# Patient Record
Sex: MALE | Race: WHITE | Employment: FULL TIME | URBAN - METROPOLITAN AREA
[De-identification: names, ages, dates, MRNs, and addresses within clinical notes are randomized per-mention and may not be internally consistent; named-entity substitution may affect disease eponyms.]

---

## 2019-12-26 ENCOUNTER — APPOINTMENT (OUTPATIENT)
Dept: GENERAL RADIOLOGY | Age: 24
End: 2019-12-26

## 2019-12-26 ENCOUNTER — HOSPITAL ENCOUNTER (EMERGENCY)
Age: 24
Discharge: HOME | End: 2019-12-26

## 2019-12-26 VITALS
BODY MASS INDEX: 37.1 KG/M2 | DIASTOLIC BLOOD PRESSURE: 80 MMHG | SYSTOLIC BLOOD PRESSURE: 156 MMHG | RESPIRATION RATE: 18 BRPM | TEMPERATURE: 97.4 F | OXYGEN SATURATION: 97 % | HEART RATE: 109 BPM | WEIGHT: 265 LBS | HEIGHT: 71 IN

## 2019-12-26 DIAGNOSIS — M25.531 PAIN IN RIGHT WRIST: Primary | ICD-10-CM

## 2019-12-26 PROCEDURE — 73110 X-RAY EXAM OF WRIST: CPT

## 2019-12-26 PROCEDURE — 99283 EMERGENCY DEPT VISIT LOW MDM: CPT

## 2019-12-26 RX ORDER — NAPROXEN 250 MG/1
250 TABLET ORAL ONCE
Status: DISCONTINUED | OUTPATIENT
Start: 2019-12-26 | End: 2019-12-26 | Stop reason: HOSPADM

## 2019-12-26 RX ORDER — NAPROXEN 500 MG/1
500 TABLET ORAL 2 TIMES DAILY
Qty: 20 TABLET | Refills: 0 | Status: SHIPPED | OUTPATIENT
Start: 2019-12-26 | End: 2021-08-03

## 2019-12-26 ASSESSMENT — ENCOUNTER SYMPTOMS
COLOR CHANGE: 0
RHINORRHEA: 0
SORE THROAT: 0
SHORTNESS OF BREATH: 0
ABDOMINAL PAIN: 0

## 2019-12-26 ASSESSMENT — PAIN DESCRIPTION - PAIN TYPE: TYPE: ACUTE PAIN

## 2019-12-26 ASSESSMENT — PAIN DESCRIPTION - LOCATION: LOCATION: WRIST

## 2019-12-26 ASSESSMENT — PAIN SCALES - GENERAL: PAINLEVEL_OUTOF10: 10

## 2019-12-26 ASSESSMENT — PAIN DESCRIPTION - ORIENTATION: ORIENTATION: RIGHT

## 2021-08-03 ENCOUNTER — HOSPITAL ENCOUNTER (EMERGENCY)
Age: 26
Discharge: HOME | End: 2021-08-03
Attending: EMERGENCY MEDICINE

## 2021-08-03 VITALS
OXYGEN SATURATION: 97 % | WEIGHT: 250 LBS | DIASTOLIC BLOOD PRESSURE: 82 MMHG | BODY MASS INDEX: 35 KG/M2 | HEART RATE: 92 BPM | TEMPERATURE: 98.5 F | RESPIRATION RATE: 16 BRPM | HEIGHT: 71 IN | SYSTOLIC BLOOD PRESSURE: 141 MMHG

## 2021-08-03 DIAGNOSIS — M10.071 ACUTE IDIOPATHIC GOUT INVOLVING TOE OF RIGHT FOOT: Primary | ICD-10-CM

## 2021-08-03 PROCEDURE — 99282 EMERGENCY DEPT VISIT SF MDM: CPT

## 2021-08-03 RX ORDER — OXYCODONE HYDROCHLORIDE AND ACETAMINOPHEN 5; 325 MG/1; MG/1
1 TABLET ORAL EVERY 6 HOURS PRN
Qty: 12 TABLET | Refills: 0 | Status: SHIPPED | OUTPATIENT
Start: 2021-08-03 | End: 2021-08-06

## 2021-08-03 RX ORDER — PREDNISONE 20 MG/1
20 TABLET ORAL 2 TIMES DAILY
Qty: 10 TABLET | Refills: 0 | Status: SHIPPED | OUTPATIENT
Start: 2021-08-03 | End: 2021-08-08

## 2021-08-03 NOTE — ED PROVIDER NOTES
on the following medications from the ED:  Discharge Medication List as of 8/3/2021 11:32 AM      START taking these medications    Details   predniSONE (DELTASONE) 20 MG tablet Take 1 tablet by mouth 2 times daily for 5 days, Disp-10 tablet, R-0Print      oxyCODONE-acetaminophen (PERCOCET) 5-325 MG per tablet Take 1 tablet by mouth every 6 hours as needed for Pain for up to 3 days. Intended supply: 3 days. Take lowest dose possible to manage pain, Disp-12 tablet, R-0Print               Disposition  Pt is discharged in stable condition.     Disposition Vitals:  BP (!) 141/82   Pulse 92   Temp 98.5 °F (36.9 °C) (Oral)   Resp 16   Ht 5' 11\" (1.803 m)   Wt 250 lb (113.4 kg)   SpO2 97%   BMI 34.87 kg/m²        Ronny Noriega MD  08/05/21 7792

## 2021-10-12 ENCOUNTER — HOSPITAL ENCOUNTER (EMERGENCY)
Age: 26
Discharge: HOME | End: 2021-10-12

## 2021-10-12 VITALS
RESPIRATION RATE: 18 BRPM | TEMPERATURE: 99.6 F | HEART RATE: 101 BPM | DIASTOLIC BLOOD PRESSURE: 77 MMHG | OXYGEN SATURATION: 98 % | SYSTOLIC BLOOD PRESSURE: 136 MMHG

## 2021-10-12 DIAGNOSIS — J02.9 SORE THROAT: ICD-10-CM

## 2021-10-12 DIAGNOSIS — J02.9 ACUTE PHARYNGITIS, UNSPECIFIED ETIOLOGY: Primary | ICD-10-CM

## 2021-10-12 PROCEDURE — 99283 EMERGENCY DEPT VISIT LOW MDM: CPT

## 2021-10-12 RX ORDER — PREDNISONE 10 MG/1
60 TABLET ORAL DAILY
Qty: 30 TABLET | Refills: 0 | Status: SHIPPED | OUTPATIENT
Start: 2021-10-12 | End: 2021-10-17

## 2021-10-12 RX ORDER — AZITHROMYCIN 250 MG/1
250 TABLET, FILM COATED ORAL SEE ADMIN INSTRUCTIONS
Qty: 6 TABLET | Refills: 0 | Status: SHIPPED | OUTPATIENT
Start: 2021-10-12 | End: 2021-10-17

## 2021-10-12 ASSESSMENT — PAIN DESCRIPTION - DESCRIPTORS: DESCRIPTORS: ACHING

## 2021-10-12 ASSESSMENT — ENCOUNTER SYMPTOMS
ABDOMINAL PAIN: 0
WHEEZING: 0
DIARRHEA: 0
SHORTNESS OF BREATH: 0
BACK PAIN: 0
NAUSEA: 0
SORE THROAT: 1
COLOR CHANGE: 0
VOMITING: 0
COUGH: 1

## 2021-10-12 ASSESSMENT — PAIN DESCRIPTION - PROGRESSION: CLINICAL_PROGRESSION: GRADUALLY WORSENING

## 2021-10-12 ASSESSMENT — PAIN DESCRIPTION - PAIN TYPE: TYPE: ACUTE PAIN

## 2021-10-12 ASSESSMENT — PAIN DESCRIPTION - ONSET: ONSET: GRADUAL

## 2021-10-12 ASSESSMENT — PAIN DESCRIPTION - FREQUENCY: FREQUENCY: CONTINUOUS

## 2021-10-12 ASSESSMENT — PAIN DESCRIPTION - LOCATION: LOCATION: THROAT

## 2021-10-12 ASSESSMENT — PAIN DESCRIPTION - ORIENTATION: ORIENTATION: INNER

## 2021-10-12 ASSESSMENT — PAIN - FUNCTIONAL ASSESSMENT: PAIN_FUNCTIONAL_ASSESSMENT: ACTIVITIES ARE NOT PREVENTED

## 2021-10-12 ASSESSMENT — PAIN SCALES - GENERAL: PAINLEVEL_OUTOF10: 10

## 2021-10-12 NOTE — LETTER
[facility] ED  [address] [address]  Phone: [phone]               October 12, 2021    Patient: Leilani Delarosa   YOB: 1995   Date of Visit: 10/12/2021       To Whom It May Concern:    Esperanza Noel was seen and treated in our emergency department on 10/12/2021. He may return to work on 10/14/2021.       Sincerely,       Kiya Benson RN         Signature:__________________________________

## 2021-10-13 NOTE — ED PROVIDER NOTES
**ADVANCED PRACTICE PROVIDER, I HAVE EVALUATED THIS [facility] ED  EMERGENCY DEPARTMENT ENCOUNTER      Pt Name: Alexis Toney  SOY:[id number]  [place] 1995  Date of evaluation: 10/12/2021  Provider: BABS Gaspar CNP      Chief Complaint:    Chief Complaint   Patient presents with    Pharyngitis     started saturday, worsened yesterday     Cough         Nursing Notes, Past Medical Hx, Past Surgical Hx, Social Hx, Allergies, and Family Hx were all reviewed and agreed with or any disagreements were addressed in the HPI.    HPI: (Location, Duration, Timing, Severity, Quality, Assoc Sx, Context, Modifying factors)    Chief Complaint of sore throat not feeling well since Saturday I do not get it done because of [we cannot give her anything any stronger    This is a  27 y.o. male who presents today with sore throat, states he has had the sore throat since Saturday, however, today is so bad he was having a hard time at work, however, he is able to swallow, but it's very painful, he has been taking throat lozenges, but medication is not helping. Rates his sore throat a 10 out of 10. States it just feels like it is gradually getting worse. He states he has had an occasional cough however he is a smoker, he smokes about a quarter of a pack a day. Denies any chest pain pleuritic chest pain or shortness of breath. States that he has had tactile fever but has not taken his temperature, states he does not typically get a temperature. He denies any use of antipyretics, has not tried any Tylenol or Motrin, has only been using cough drops. He denies any additional complaints, no additional aggravating relieving factors. Patient presents awake, alert and in no acute distress or toxic appearance. PastMedical/Surgical History:      Diagnosis Date    GERD (gastroesophageal reflux disease)     Gout     Headache     MIGRAINES     History reviewed.  No pertinent surgical history. Medications:  Previous Medications    COLCHICINE (COLCRYS) 0.6 MG TABLET    1 tablet twice a day until symptoms resolve    NAPROXEN (NAPROSYN) 500 MG TABLET    Take 1 tablet by mouth 2 times daily         Review of Systems:  (2-9 systems needed)  Review of Systems   Constitutional: Negative for chills and fever. HENT: Positive for sore throat. Negative for congestion. Patient complains of sore throat, states he has had the sore throat since Saturday, however, today is so bad he was having a hard time at work, however, he is able to swallow, but it's very painful, he has been taking throat lozenges, but medication is not helping. Rates his sore throat a 10 out of 10. States it just feels like it is gradually getting worse. Respiratory: Positive for cough. Negative for shortness of breath and wheezing. He does report having occasional cough, states that he is a smoker, pack of cigarettes lasts him about 2 to 3 days. However denies any chest pain pleuritic chest pain or shortness of breath. Cardiovascular: Negative for chest pain. Gastrointestinal: Negative for abdominal pain, diarrhea, nausea and vomiting. Genitourinary: Negative for difficulty urinating, dysuria, frequency and hematuria. Musculoskeletal: Negative for back pain. Skin: Negative for color change. Neurological: Negative for weakness, numbness and headaches. \"Positives and Pertinent negatives as per HPI\"    Physical Exam:  Physical Exam  Vitals and nursing note reviewed. Constitutional:       Appearance: He is well-developed. He is not diaphoretic. HENT:      Head: Normocephalic. Right Ear: External ear normal.      Left Ear: External ear normal.      Mouth/Throat:      Mouth: Mucous membranes are moist.      Pharynx: Posterior oropharyngeal erythema present. Comments: Patient has a significant mild erythema in the posterior pharynx.   Tonsils are 1+ in size, the uvula is midline, there is no obvious exudate but his throat does appear very well. Eyes:      General: No scleral icterus. Right eye: No discharge. Left eye: No discharge. Neck:      Comments: Patient has bilateral anterior cervical lymphadenopathy however lymph nodes are slightly tender but mobile. No posterior cervical lymphadenopathy. No nuchal rigidity or meningeal irritation. Cardiovascular:      Rate and Rhythm: Normal rate and regular rhythm. Pulmonary:      Effort: Pulmonary effort is normal. No respiratory distress. Breath sounds: Normal breath sounds. Comments: Lungs are clear anteriorly and posteriorly, patient is not tachypneic or dyspneic, saturations 98% on room air. Abdominal:      Palpations: Abdomen is soft. Musculoskeletal:         General: Normal range of motion. Cervical back: Normal range of motion and neck supple. Tenderness present. Skin:     General: Skin is warm. Capillary Refill: Capillary refill takes less than 2 seconds. Coloration: Skin is not pale. Neurological:      General: No focal deficit present. Mental Status: He is alert and oriented to person, place, and time. GCS: GCS eye subscore is 4. GCS verbal subscore is 5. GCS motor subscore is 6. Comments: Patient is awake, alert following all commands correctly, neurologically intact no focal deficits. Psychiatric:         Behavior: Behavior normal.         MEDICAL DECISION MAKING    Vitals:    Vitals:    10/12/21 2007   BP: 136/77   Pulse: 101   Resp: 18   Temp: 99.6 °F (37.6 °C)   TempSrc: Temporal   SpO2: 98%       LABS:Labs Reviewed - No data to display     Remainder of labs reviewed and were negative at this time or not returned at the time of this note.     RADIOLOGY:   Non-plain film images such as CT, Ultrasound and MRI are read by the radiologist. Viry BERRIOS APRN - CNP have directly visualized the radiologic plain film image(s) with the below findings:      Interpretation per the Radiologist below, if available at the time of this note:    No orders to display        No results found. MEDICAL DECISION MAKING / ED COURSE:      PROCEDURES:   Procedures    None    Patient was given:  Medications - No data to display    Patient complains of sore throat, states he has had the sore throat since Saturday, however, today is so bad he was having a hard time at work, however, he is able to swallow, but it's very painful, he has been taking throat lozenges, but medication is not helping. Rates his sore throat a 10 out of 10. States it just feels like it is gradually getting worse. After evaluation and examination of the patient he meets Centor criteria with sore throat, fever, smoker cough only and erythema, however, I have no concerns for covid19. In addition, the patient is adequately hydrated, clinically nontoxic, and does not have any findings that would suggest impending airway issues. I do not suspect peritonsillar abscess, retropharyngeal abscess, epiglottitis or other more emergent etiology. There is no hot potato voice. Therefore, shared medical decision was made between the patient and myself and we agreed that he would be discharged home with outpatient follow up. He was started on a z-pack and prednisone, I encouraged him to stop smoking. Patient is advised to follow-up with their family doctor within the next 24-48 hours and return to the emergency department with any concerns. The patient tolerated their visit well. I evaluated the patient. The physician was available for consultation as needed. The patient and / or the family were informed of the results of any tests, a time was given to answer questions, a plan was proposed and they agreed with plan. Patient verbalized understanding of discharge instructions and was discharged from the department in stable condition. CLINICAL IMPRESSION:  1.  Acute pharyngitis, unspecified etiology    2.  Sore throat        DISPOSITION Decision To Discharge 10/12/2021 08:22:22 PM      PATIENT REFERRED TO:  Piedad Jennings  [phone]  Schedule an appointment as soon as possible for a visit in 3 days  this is a PCP referral, follow up in the next week for re-evaluation    [place] [facility] PHYSICAL [facility] ED  [address] [facility]  Go to   If symptoms worsen      DISCHARGE MEDICATIONS:  New Prescriptions    AZITHROMYCIN (ZITHROMAX) 250 MG TABLET    Take 1 tablet by mouth See Admin Instructions for 5 days 500mg on day 1 followed by 250mg on days 2 - 5    PREDNISONE (DELTASONE) 10 MG TABLET    Take 6 tablets by mouth daily for 5 doses       DISCONTINUED MEDICATIONS:  Discontinued Medications    No medications on file              (Please note the MDM and HPI sections of this note were completed with a voice recognition program.  Efforts were made to edit the dictations but occasionally words are mis-transcribed.)    Electronically signed, BABS Carney CNP,          BBAS Carney CNP  10/12/21 2050

## 2021-12-30 ENCOUNTER — HOSPITAL ENCOUNTER (EMERGENCY)
Age: 26
Discharge: HOME | End: 2021-12-30

## 2021-12-30 VITALS
HEIGHT: 71 IN | BODY MASS INDEX: 35 KG/M2 | TEMPERATURE: 97.2 F | HEART RATE: 98 BPM | SYSTOLIC BLOOD PRESSURE: 142 MMHG | WEIGHT: 250 LBS | OXYGEN SATURATION: 98 % | DIASTOLIC BLOOD PRESSURE: 92 MMHG | RESPIRATION RATE: 18 BRPM

## 2021-12-30 DIAGNOSIS — M54.31 SCIATICA OF RIGHT SIDE: Primary | ICD-10-CM

## 2021-12-30 PROCEDURE — 6370000000 HC RX 637 (ALT 250 FOR IP): Performed by: NURSE PRACTITIONER

## 2021-12-30 PROCEDURE — 99284 EMERGENCY DEPT VISIT MOD MDM: CPT

## 2021-12-30 RX ORDER — METHOCARBAMOL 500 MG/1
500 TABLET, FILM COATED ORAL 3 TIMES DAILY
Qty: 15 TABLET | Refills: 0 | Status: SHIPPED | OUTPATIENT
Start: 2021-12-30 | End: 2022-01-04

## 2021-12-30 RX ORDER — METHOCARBAMOL 500 MG/1
500 TABLET, FILM COATED ORAL ONCE
Status: COMPLETED | OUTPATIENT
Start: 2021-12-30 | End: 2021-12-30

## 2021-12-30 RX ORDER — LIDOCAINE 50 MG/G
1 PATCH TOPICAL DAILY
Qty: 10 PATCH | Refills: 0 | Status: SHIPPED | OUTPATIENT
Start: 2021-12-30 | End: 2022-01-09

## 2021-12-30 RX ORDER — LIDOCAINE 4 G/G
1 PATCH TOPICAL ONCE
Status: DISCONTINUED | OUTPATIENT
Start: 2021-12-30 | End: 2021-12-30 | Stop reason: HOSPADM

## 2021-12-30 RX ORDER — PREDNISONE 20 MG/1
TABLET ORAL
Qty: 18 TABLET | Refills: 0 | Status: SHIPPED | OUTPATIENT
Start: 2021-12-30 | End: 2022-09-21

## 2021-12-30 RX ORDER — PREDNISONE 20 MG/1
60 TABLET ORAL ONCE
Status: COMPLETED | OUTPATIENT
Start: 2021-12-30 | End: 2021-12-30

## 2021-12-30 RX ADMIN — PREDNISONE 60 MG: 20 TABLET ORAL at 18:20

## 2021-12-30 RX ADMIN — METHOCARBAMOL TABLETS 500 MG: 500 TABLET, COATED ORAL at 18:20

## 2021-12-30 ASSESSMENT — PAIN SCALES - GENERAL: PAINLEVEL_OUTOF10: 10

## 2021-12-30 ASSESSMENT — ENCOUNTER SYMPTOMS
SORE THROAT: 0
BACK PAIN: 1
SHORTNESS OF BREATH: 0
RHINORRHEA: 0
ABDOMINAL PAIN: 0
COLOR CHANGE: 0

## 2021-12-30 ASSESSMENT — PAIN DESCRIPTION - PAIN TYPE: TYPE: ACUTE PAIN

## 2021-12-30 ASSESSMENT — PAIN DESCRIPTION - ORIENTATION: ORIENTATION: RIGHT

## 2021-12-30 NOTE — Clinical Note
Jose Dickerson was seen and treated in our emergency department on 12/30/2021. He may return to work on 01/01/2022. If you have any questions or concerns, please don't hesitate to call.       Afshin Gerber, APRN - CNP

## 2021-12-30 NOTE — Clinical Note
Swetha Ramírez was seen and treated in our emergency department on 12/30/2021. He may return to work on 01/01/2022. If you have any questions or concerns, please don't hesitate to call.       Edy Tesfaye, APRN - CNP

## 2021-12-30 NOTE — ED PROVIDER NOTES
Evaluated by [id number] [facility] Provider          [facility]  EMERGENCY DEPARTMENT ENCOUNTER        Pt Name: Candelario Landau  MRN: [id number]  [place] 1995  Dateof evaluation: 12/30/2021  Provider: BABS Arvizu CNP  PCP: No primary care provider on file. ED Attending: No att. providers found    [facility]       Chief Complaint   Patient presents with    Back Pain     right sided butt pain and leg pain       HISTORY OF PRESENTILLNESS   (Location/Symptom, Timing/Onset, Context/Setting, Quality, Duration, Modifying Factors, Severity)  Note limiting factors. Candelario Landau is a 27 y.o. male for right buttocks pain. Onset was for a while. Context includes patient states that he has pain to his right buttocks that radiates down his right leg for a while. He denies any injuries. He denies any back pain or caudal anesthesia. Alleviating factors include nothing. Aggravating factors include nothing. Pain is 10/10. Tylenol and Motrin has been used for pain today. Nursing Notes were all reviewed and agreed with or any disagreements were addressed  in the HPI. REVIEW OF SYSTEMS    (2-9 systems for level 4, 10 or more for level 5)     Review of Systems   Constitutional: Negative for fever. HENT: Negative for congestion, rhinorrhea and sore throat. Respiratory: Negative for shortness of breath. Cardiovascular: Negative for chest pain. Gastrointestinal: Negative for abdominal pain. Genitourinary: Negative for decreased urine volume and difficulty urinating. Musculoskeletal: Positive for back pain. Negative for arthralgias and myalgias. Skin: Negative for color change and rash. Neurological: Negative for dizziness and light-headedness. Psychiatric/Behavioral: Negative for agitation. All other systems reviewed and are negative. Positives and Pertinent negatives as per HPI.   Except as noted above in the ROS, all other systems were reviewed and negative. PAST MEDICAL HISTORY     Past Medical History:   Diagnosis Date    GERD (gastroesophageal reflux disease)     Gout     Headache     MIGRAINES         SURGICAL HISTORY     No past surgical history on file. CURRENT MEDICATIONS       Previous Medications    COLCHICINE (COLCRYS) 0.6 MG TABLET    1 tablet twice a day until symptoms resolve    NAPROXEN (NAPROSYN) 500 MG TABLET    Take 1 tablet by mouth 2 times daily         ALLERGIES     Patient has no known allergies. FAMILY HISTORY     No family history on file. SOCIAL HISTORY       Social History     Socioeconomic History    Marital status: Single     Spouse name: Not on file    Number of children: Not on file    Years of education: Not on file    Highest education level: Not on file   Occupational History    Not on file   Tobacco Use    Smoking status: Current Some Day Smoker     Packs/day: 0.50     Types: Cigars    Smokeless tobacco: Never Used   Substance and Sexual Activity    Alcohol use: Yes     Comment: occ    Drug use: No    Sexual activity: Not on file   Other Topics Concern    Not on file   Social History Narrative    Not on file     Social Determinants of Health     Financial Resource Strain:     Difficulty of Paying Living Expenses: Not on file   Food Insecurity:     Worried About Running Out of Food in the Last Year: Not on file    Hieu of Food in the Last Year: Not on file   Transportation Needs:     Lack of Transportation (Medical): Not on file    Lack of Transportation (Non-Medical):  Not on file   Physical Activity:     Days of Exercise per Week: Not on file    Minutes of Exercise per Session: Not on file   Stress:     Feeling of Stress : Not on file   Social Connections:     Frequency of Communication with Friends and Family: Not on file    Frequency of Social Gatherings with Friends and Family: Not on file    Attends Mandaeism Services: Not on file   [employer] of Clubs or Organizations: Not on file    Attends Club or Organization Meetings: Not on file    Marital Status: Not on file   Intimate Partner Violence:     Fear of Current or Ex-Partner: Not on file    Emotionally Abused: Not on file    Physically Abused: Not on file    Sexually Abused: Not on file   Housing Stability:     Unable to Pay for Housing in the Last Year: Not on file    Number of [place] in the Last Year: Not on file    Unstable Housing in the Last Year: Not on file       SCREENINGS             PHYSICAL EXAM  (up to 7 for level 4, 8 or more for level 5)     ED Triage Vitals [12/30/21 1558]   BP Temp Temp Source Pulse Resp SpO2 Height Weight   (!) 142/92 97.2 °F (36.2 °C) Temporal 98 18 98 % 5' 11\" (1.803 m) 250 lb (113.4 kg)       Physical Exam  Constitutional:       Appearance: He is well-developed. HENT:      Head: Normocephalic and atraumatic. Cardiovascular:      Rate and Rhythm: Normal rate. Pulmonary:      Effort: Pulmonary effort is normal. No respiratory distress. Abdominal:      General: There is no distension. Palpations: Abdomen is soft. Tenderness: There is no abdominal tenderness. Musculoskeletal:         General: Tenderness present. No swelling, deformity or signs of injury. Cervical back: Normal range of motion. Comments: Decreased range of motion due to pain elicited with movement. Tenderness with palpation to the right butt cheek. Sensation strength and pulses intact to lower extremities. Patient denies any caudal anesthesia but has right-sided radiculopathy. Deep tendon reflexes intact. Skin:     General: Skin is warm and dry. Neurological:      Mental Status: He is alert and oriented to person, place, and time. DIAGNOSTIC RESULTS   LABS:    Labs Reviewed - No data to display    All other labs werewithin normal range or not returned as of this dictation. EKG:  All EKG's are interpreted by the Emergency Department Physician who either signs or Co-signs this chart in the absence of a cardiologist.  Please see their note for interpretation of EKG. RADIOLOGY:           Interpretation per the Radiologist below, if available at the time of this note:    No orders to display     No results found. PROCEDURES   Unless otherwise noted below, none     Procedures     CRITICAL CARE TIME   N/A    CONSULTS:  None      EMERGENCYDEPARTMENT COURSE and DIFFERENTIAL DIAGNOSIS/MDM:   Vitals:    Vitals:    12/30/21 1558   BP: (!) 142/92   Pulse: 98   Resp: 18   Temp: 97.2 °F (36.2 °C)   TempSrc: Temporal   SpO2: 98%   Weight: 250 lb (113.4 kg)   Height: 5' 11\" (1.803 m)       Patient was given the following medications:  Medications   predniSONE (DELTASONE) tablet 60 mg (has no administration in time range)   lidocaine 4 % external patch 1 patch (has no administration in time range)   methocarbamol (ROBAXIN) tablet 500 mg (has no administration in time range)       Patient was seen and evaluated by myself. Patient here for right-sided buttocks pain. Patient states he has had pain for a while to his right buttocks that radiates in his right leg. He denies any caudal anesthesia. On exam he is awake and alert hemodynamically stable nontoxic in appearance. He is neurologically intact to his arms and legs. Patient was provided with prednisone lidocaine patch and Robaxin. Patient will be discharged with instructions to follow-up with the PCP provided. He was given a physical therapy and orthopedic referral.  He was also encouraged to return to the ED for worsening symptoms. Patient was ultimately discharged with all questions answered. Patient did have prednisone lidocaine and Robaxin prescription sent to the [facility] in [facility]. The patient tolerated their visit well. I have evaluated this patient. My supervising physician was available for consultation.  The patient and / or the family were informed of the results of any tests, a time was given to

## 2021-12-30 NOTE — Clinical Note
Sarah Fine was seen and treated in our emergency department on 12/30/2021. He may return to work on 01/01/2022. If you have any questions or concerns, please don't hesitate to call.       Uche Hoffmann, BABS - CNP

## 2022-01-25 ENCOUNTER — APPOINTMENT (OUTPATIENT)
Dept: GENERAL RADIOLOGY | Age: 27
End: 2022-01-25

## 2022-01-25 ENCOUNTER — HOSPITAL ENCOUNTER (EMERGENCY)
Age: 27
Discharge: HOME | End: 2022-01-25

## 2022-01-25 VITALS
SYSTOLIC BLOOD PRESSURE: 152 MMHG | RESPIRATION RATE: 16 BRPM | BODY MASS INDEX: 35 KG/M2 | OXYGEN SATURATION: 97 % | HEART RATE: 88 BPM | DIASTOLIC BLOOD PRESSURE: 102 MMHG | TEMPERATURE: 98 F | HEIGHT: 71 IN | WEIGHT: 250 LBS

## 2022-01-25 DIAGNOSIS — M25.561 ACUTE PAIN OF RIGHT KNEE: ICD-10-CM

## 2022-01-25 DIAGNOSIS — M25.461 KNEE EFFUSION, RIGHT: Primary | ICD-10-CM

## 2022-01-25 PROCEDURE — 99283 EMERGENCY DEPT VISIT LOW MDM: CPT

## 2022-01-25 PROCEDURE — 73562 X-RAY EXAM OF KNEE 3: CPT

## 2022-01-25 RX ORDER — PREDNISONE 10 MG/1
60 TABLET ORAL DAILY
Qty: 30 TABLET | Refills: 0 | Status: SHIPPED | OUTPATIENT
Start: 2022-01-25 | End: 2022-01-30

## 2022-01-25 RX ORDER — ACETAMINOPHEN AND CODEINE PHOSPHATE 300; 30 MG/1; MG/1
1 TABLET ORAL 3 TIMES DAILY PRN
Qty: 6 TABLET | Refills: 0 | Status: SHIPPED | OUTPATIENT
Start: 2022-01-25 | End: 2022-01-28

## 2022-01-25 ASSESSMENT — PAIN SCALES - GENERAL: PAINLEVEL_OUTOF10: 10

## 2022-01-25 ASSESSMENT — PAIN DESCRIPTION - ONSET: ONSET: GRADUAL

## 2022-01-25 ASSESSMENT — PAIN DESCRIPTION - ORIENTATION: ORIENTATION: RIGHT

## 2022-01-25 ASSESSMENT — PAIN DESCRIPTION - PAIN TYPE: TYPE: ACUTE PAIN

## 2022-01-25 ASSESSMENT — PAIN DESCRIPTION - LOCATION: LOCATION: KNEE

## 2022-01-25 ASSESSMENT — PAIN DESCRIPTION - DESCRIPTORS: DESCRIPTORS: THROBBING

## 2022-01-25 ASSESSMENT — PAIN DESCRIPTION - FREQUENCY: FREQUENCY: CONTINUOUS

## 2022-01-26 NOTE — ED NOTES
--Patient provided with discharge instructions and any prescriptions. --Instructions, dosing, and follow-up appointments reviewed with patient/family. No further questions or needs at this time. --Vital signs and patient stable upon discharge. --Patient ambulatory to [facility].        Yoni Rodrigues RN  01/25/22 [id number]

## 2022-01-26 NOTE — ED PROVIDER NOTES
**ADVANCED PRACTICE PROVIDER, I HAVE EVALUATED THIS [facility] ED  EMERGENCY DEPARTMENT ENCOUNTER      Pt Name: Lucia Guadarrama  [facility]:[id number]  [place] 1995  Date of evaluation: 1/25/2022  Provider: BABS Schreiber CNP      Chief Complaint:    Chief Complaint   Patient presents with    Knee Pain     pt states right knee pain X2 weeks         Nursing Notes, Past Medical Hx, Past Surgical Hx, Social Hx, Allergies, and Family Hx were all reviewed and agreed with or any disagreements were addressed in the HPI.    HPI: (Location, Duration, Timing, Severity, Quality, Assoc Sx, Context, Modifying factors)    Chief Complaint of right knee pain    This is a  27 y.o. male who presents with right knee pain and swelling for the past two weeks, however, yesterday he was at work moving a tv stand and was having more pain going up and down the stairs. He states that he is now having more pain and more swelling. He rates his pain 10/10 and states movement and walking increases his pain, he has tried tylenol with minimal improvement. Denies any numbness, tingling or paresthesias, denies an calf pain or swelling and no history of blood clots. Denies any additional complaints, no additional aggravating or alleviating factors. He presents awake, alert and in no acute distress or toxic appearance. PastMedical/Surgical History:      Diagnosis Date    GERD (gastroesophageal reflux disease)     Gout     Headache     MIGRAINES     History reviewed. No pertinent surgical history. Medications:  Discharge Medication List as of 1/25/2022 10:23 PM      CONTINUE these medications which have NOT CHANGED    Details   ! ! predniSONE (DELTASONE) 20 MG tablet Take 3 tabs orally daily for 3 days, then take 2 tabs orally for 3 days then take 1 tab orally for 3 days. , Disp-18 tablet, R-0Normal      colchicine (COLCRYS) 0.6 MG tablet 1 tablet twice a day until symptoms resolve, Disp-30 tablet, R-0Print      naproxen (NAPROSYN) 500 MG tablet Take 1 tablet by mouth 2 times daily, Disp-20 tablet, R-0Print       !! - Potential duplicate medications found. Please discuss with provider. Review of Systems:  (2-9 systems needed)  Review of Systems   Constitutional: Negative for chills and fever. HENT: Negative for congestion. Respiratory: Negative for cough and shortness of breath. Cardiovascular: Negative for chest pain. Gastrointestinal: Negative for abdominal pain, diarrhea, nausea and vomiting. Musculoskeletal: Positive for arthralgias and joint swelling. Negative for back pain. Patient right knee pain and swelling for the past two weeks, however, yesterday he was at work moving a tv stand and was having more pain going up and down the stairs. He states that he is now having more pain and more swelling. He rates his pain 10/10 and states movement    Skin: Negative for color change. Neurological: Negative for weakness, numbness and headaches. \"Positives and Pertinent negatives as per HPI\"    Physical Exam:  Physical Exam  Vitals and nursing note reviewed. Constitutional:       Appearance: He is well-developed. He is not diaphoretic. HENT:      Head: Normocephalic. Right Ear: External ear normal.      Left Ear: External ear normal.   Eyes:      General: No scleral icterus. Right eye: No discharge. Left eye: No discharge. Cardiovascular:      Rate and Rhythm: Normal rate and regular rhythm. Pulmonary:      Effort: Pulmonary effort is normal. No respiratory distress. Comments: Lungs are clear anteriorly and posteriorly, patient is not skeptic or dyspneic, saturations are 97% on room air. Abdominal:      Palpations: Abdomen is soft. Musculoskeletal:         General: Swelling and tenderness present. Normal range of motion. Cervical back: Normal range of motion and neck supple.       Comments: Patient has obvious swelling to the subpatellar region on the right knee no obvious effusion, patella is midline. There is no heat or redness to the knee. There is no palpable crepitus or obvious deformity, no break in skin integrity. He has no open wounds, lacerations or abrasions. He is neurovascular intact without numbness or tingling or paresthesias. No swelling in the lower aspect of his leg. Compartments are soft. Peripheral pulses are 2+. No peripheral edema. Skin:     General: Skin is warm. Capillary Refill: Capillary refill takes less than 2 seconds. Coloration: Skin is not pale. Neurological:      General: No focal deficit present. Mental Status: He is alert and oriented to person, place, and time. GCS: GCS eye subscore is 4. GCS verbal subscore is 5. GCS motor subscore is 6. Psychiatric:         Behavior: Behavior normal.         MEDICAL DECISION MAKING    Vitals:    Vitals:    01/25/22 2031   BP: (!) 152/102   Pulse: 88   Resp: 16   Temp: 98 °F (36.7 °C)   TempSrc: Tympanic   SpO2: 97%   Weight: 250 lb (113.4 kg)   Height: 5' 11\" (1.803 m)       LABS:Labs Reviewed - No data to display     Remainder of labs reviewed and were negative at this time or not returned at the time of this note. RADIOLOGY:   Non-plain film images such as CT, Ultrasound and MRI are read by the radiologist. Viry BERRIOS APRN - CNP have directly visualized the radiologic plain film image(s) with the below findings:      Interpretation per the Radiologist below, if available at the time of this note:    XR KNEE RIGHT (3 VIEWS)   Final Result   Moderate to large suprapatellar effusion with no acute bony abnormality.               MEDICAL DECISION MAKING / ED COURSE:    Because of high probability of sudden clinical deterioration of the patient's condition and risk of further deterioration, critical care time required my full attention to the patient's condition; which included chart data review, documentation, medication ordering, reviewing the patient's old records, reevaluation patient's cardiac, pulmonary and neurological status. Reevaluation of vital signs. Consultations with ED attending and admitting physician. Ordering, interpreting reviewing diagnostic testing. Therefore a critical care time was 18 minutes of direct attention to the patient's condition did not include time spent on procedures. PROCEDURES:   Procedures  Procedure was supervised by my attending physician Dr. Donna Phillips  Arthrocentesis Procedure Note    Indication: Joint pain and joint swelling    Consent: The patient was counseled regarding the procedure, it's indications, risks, potential complications and alternatives and any questions were answered. Consent was obtained. Procedure: The right knee was positioned appropriately and the landmarks were identified. Local anesthesia was obtained by infiltration using 1% Lidocaine without epinephrine. The area was then prepped and draped in the usual sterile fashion. A needle was then introduced into the joint space at which point 100 cc of clear, slightly yellow fluid was aspirated. A joint injection was not performed. The aspirated fluid was discarded. .  A sterile dressing was then applied to the site. The patient tolerated the procedure well. Complications: None    Patient was given:  Medications - No data to display    Patient right knee pain and swelling for the past two weeks, however, yesterday he was at work moving a tv stand and was having more pain going up and down the stairs. He states that he is now having more pain and more swelling. He rates his pain 10/10 and states movement. After evaluation and examination the patient x-ray was obtained, x-ray shows moderate to large suprapatellar effusion with no acute bony abnormality. I then spoke with the patient about doing an arthrocentesis to the drain the fluid, attending physician also supervised the procedure.   Please see procedure note.  Patient tolerated the procedure well. I did educate the patient about having him follow-up with orthopedic specialist in regards to this happening again. However, no concerns for fracture, dislocation, DVT, septic joint or other emergent etiology. Therefore, shared medical decision was made to the patient, sending physician and myself we agreed the patient could be discharged home with outpatient follow-up. Patient was discharged home referral back to Ortho, follow-up in 2 days for reevaluation. Educate about RICE and using the Ace wrap that we applied. Educated about taking prednisone and Tylenol for 3 as prescribed. Return the ER if any worsening or concerning symptoms. The patient tolerated their visit well. I evaluated the patient. The physician was available for consultation as needed. The patient and / or the family were informed of the results of any tests, a time was given to answer questions, a plan was proposed and they agreed with plan. Patient verbalized understanding of discharge instructions and the patient was discharged from the department in stable condition peer    CLINICAL IMPRESSION:  1. Knee effusion, right    2. Acute pain of right knee        DISPOSITION Decision To Discharge 01/25/2022 10:11:20 PM      PATIENT REFERRED TO:  Jasmin Damon MD  [address]  [phone]    Schedule an appointment as soon as possible for a visit in 1 day  This is an orthopedic specialist I want you to follow-up with, please call in the morning and make an appointment to be seen in the next 2 to 3 days for reevaluation      DISCHARGE MEDICATIONS:  Discharge Medication List as of 1/25/2022 10:23 PM      START taking these medications    Details   ! ! predniSONE (DELTASONE) 10 MG tablet Take 6 tablets by mouth daily for 5 doses, Disp-30 tablet, R-0Print      acetaminophen-codeine (TYLENOL/CODEINE #3) 300-30 MG per tablet Take 1 tablet by mouth 3 times daily as needed for Pain for up to 3 days. , Disp-6 tablet, R-0Print       !! - Potential duplicate medications found. Please discuss with provider.           DISCONTINUED MEDICATIONS:  Discharge Medication List as of 1/25/2022 10:23 PM                 (Please note the MDM and HPI sections of this note were completed with a voice recognition program.  Efforts were made to edit the dictations but occasionally words are mis-transcribed.)    Electronically signed, BABS Schreiber CNP,          BABS Schreiber CNP  01/31/22 4175

## 2022-01-26 NOTE — ED NOTES
6 in ace wrap applied to pt R knee. Pt instructed on use and verbalized understanding. Bandage also applied to pt knee over needle incision site.       Dalton Kruse RN  01/25/22 3770

## 2022-01-26 NOTE — ED NOTES
Pt ambulatory with steady gait and without use of assistive devices. Pt denies any lightheaded or dizziness.       Dalton Kruse RN  01/25/22 7335

## 2022-01-31 ASSESSMENT — ENCOUNTER SYMPTOMS
COUGH: 0
DIARRHEA: 0
BACK PAIN: 0
SHORTNESS OF BREATH: 0
ABDOMINAL PAIN: 0
NAUSEA: 0
VOMITING: 0
COLOR CHANGE: 0

## 2022-02-02 NOTE — ED PROVIDER NOTES
I independently examined and evaluated Manuel Gerardo. In brief, patient is a 27 y.o. M presenting with 2 week history of R knee pain and swelling. Focused exam revealed patient resting comfortably, no acute distress. Swelling and pain with range of motion of the right knee. No overlying evidence of erythema or cellulitis. Right lower extremity is neurovascularly intact, compartments soft. XR KNEE RIGHT (3 VIEWS)   Final Result   Moderate to large suprapatellar effusion with no acute bony abnormality. ED course: Patient is a 79-year-old male, presenting with concerns for 2-week history of right knee pain and swelling. Patient was seen in conjunction with PENG Roetting, please refer to her note for further details of patient's work-up and treatment. Arthrocentesis was performed under my supervision without complication. Patient was advised to follow-up with orthopedics for further evaluation. He is comfortable in agreement with plan of care. He is discharged. All diagnostic, treatment, and disposition decisions were made by myself in conjunction with the advanced practice provider. For all further details of the patient's emergency department visit, please see the advanced practice provider's documentation. 1. Knee effusion, right    2. Acute pain of right knee      Comment: Please note this report has been produced using speech recognition software and may contain errors related to that system including errors in grammar, punctuation, and spelling, as well as words and phrases that may be inappropriate. If there are any questions or concerns please feel free to contact the dictating provider for clarification.            Eric Townsend MD  02/02/22 3175

## 2022-02-11 ENCOUNTER — HOSPITAL ENCOUNTER (EMERGENCY)
Age: 27
Discharge: HOME | End: 2022-02-11
Attending: EMERGENCY MEDICINE

## 2022-02-11 ENCOUNTER — APPOINTMENT (OUTPATIENT)
Dept: GENERAL RADIOLOGY | Age: 27
End: 2022-02-11

## 2022-02-11 VITALS
TEMPERATURE: 98.1 F | DIASTOLIC BLOOD PRESSURE: 58 MMHG | RESPIRATION RATE: 21 BRPM | OXYGEN SATURATION: 98 % | SYSTOLIC BLOOD PRESSURE: 111 MMHG | HEART RATE: 93 BPM

## 2022-02-11 DIAGNOSIS — M25.561 RIGHT KNEE PAIN, UNSPECIFIED CHRONICITY: Primary | ICD-10-CM

## 2022-02-11 DIAGNOSIS — Z87.39 HISTORY OF GOUT: ICD-10-CM

## 2022-02-11 DIAGNOSIS — M25.461 EFFUSION OF RIGHT KNEE: ICD-10-CM

## 2022-02-11 LAB
A/G RATIO: 1.6 (ref 1.1–2.2)
ALBUMIN SERPL-MCNC: 4.7 G/DL (ref 3.4–5)
ALP BLD-CCNC: 63 U/L (ref 40–129)
ALT SERPL-CCNC: 35 U/L (ref 10–40)
ANION GAP SERPL CALCULATED.3IONS-SCNC: 13 MMOL/L (ref 3–16)
APPEARANCE FLUID: NORMAL
AST SERPL-CCNC: 18 U/L (ref 15–37)
BASOPHILS ABSOLUTE: 0 K/UL (ref 0–0.2)
BASOPHILS RELATIVE PERCENT: 0.3 %
BILIRUB SERPL-MCNC: 0.9 MG/DL (ref 0–1)
BUN BLDV-MCNC: 12 MG/DL (ref 7–20)
CALCIUM SERPL-MCNC: 9.9 MG/DL (ref 8.3–10.6)
CELL COUNT FLUID TYPE: NORMAL
CHLORIDE BLD-SCNC: 98 MMOL/L (ref 99–110)
CLOT EVALUATION: NORMAL
CO2: 26 MMOL/L (ref 21–32)
COLOR FLUID: YELLOW
CREAT SERPL-MCNC: 1.1 MG/DL (ref 0.9–1.3)
CRYSTALS, FLUID: NORMAL
EOSINOPHILS ABSOLUTE: 0.1 K/UL (ref 0–0.6)
EOSINOPHILS RELATIVE PERCENT: 0.5 %
GFR AFRICAN AMERICAN: >60
GFR NON-AFRICAN AMERICAN: >60
GLUCOSE BLD-MCNC: 109 MG/DL (ref 70–99)
HCT VFR BLD CALC: 44.7 % (ref 40.5–52.5)
HEMOGLOBIN: 15.2 G/DL (ref 13.5–17.5)
LYMPHOCYTES ABSOLUTE: 4 K/UL (ref 1–5.1)
LYMPHOCYTES RELATIVE PERCENT: 32.1 %
LYMPHOCYTES, BODY FLUID: 25 %
MACROPHAGE FLUID: 24 %
MCH RBC QN AUTO: 29.3 PG (ref 26–34)
MCHC RBC AUTO-ENTMCNC: 34 G/DL (ref 31–36)
MCV RBC AUTO: 86.1 FL (ref 80–100)
MONOCYTES ABSOLUTE: 0.7 K/UL (ref 0–1.3)
MONOCYTES RELATIVE PERCENT: 5.6 %
NEUTROPHIL, FLUID: 51 %
NEUTROPHILS ABSOLUTE: 7.7 K/UL (ref 1.7–7.7)
NEUTROPHILS RELATIVE PERCENT: 61.5 %
NUCLEATED CELLS FLUID: 2625 /CUMM
NUMBER OF CELLS COUNTED FLUID: 100
PATH CONSULT FLUID: YES
PDW BLD-RTO: 13.5 % (ref 12.4–15.4)
PLATELET # BLD: 260 K/UL (ref 135–450)
PMV BLD AUTO: 7.9 FL (ref 5–10.5)
POTASSIUM REFLEX MAGNESIUM: 3.9 MMOL/L (ref 3.5–5.1)
RBC # BLD: 5.2 M/UL (ref 4.2–5.9)
RBC FLUID: 175 /CUMM
SEDIMENTATION RATE, ERYTHROCYTE: 28 MM/HR (ref 0–15)
SODIUM BLD-SCNC: 137 MMOL/L (ref 136–145)
SOURCE BODY FLUID: NORMAL
TOTAL PROTEIN: 7.7 G/DL (ref 6.4–8.2)
URIC ACID, SERUM: 11.4 MG/DL (ref 3.5–7.2)
WBC # BLD: 12.5 K/UL (ref 4–11)

## 2022-02-11 PROCEDURE — 6360000002 HC RX W HCPCS: Performed by: PHYSICIAN ASSISTANT

## 2022-02-11 PROCEDURE — 89051 BODY FLUID CELL COUNT: CPT

## 2022-02-11 PROCEDURE — 99284 EMERGENCY DEPT VISIT MOD MDM: CPT

## 2022-02-11 PROCEDURE — 89060 EXAM SYNOVIAL FLUID CRYSTALS: CPT

## 2022-02-11 PROCEDURE — 20610 DRAIN/INJ JOINT/BURSA W/O US: CPT

## 2022-02-11 PROCEDURE — 6370000000 HC RX 637 (ALT 250 FOR IP): Performed by: PHYSICIAN ASSISTANT

## 2022-02-11 PROCEDURE — 96374 THER/PROPH/DIAG INJ IV PUSH: CPT

## 2022-02-11 PROCEDURE — 86140 C-REACTIVE PROTEIN: CPT

## 2022-02-11 PROCEDURE — 87205 SMEAR GRAM STAIN: CPT

## 2022-02-11 PROCEDURE — 73560 X-RAY EXAM OF KNEE 1 OR 2: CPT

## 2022-02-11 PROCEDURE — 87070 CULTURE OTHR SPECIMN AEROBIC: CPT

## 2022-02-11 PROCEDURE — 86431 RHEUMATOID FACTOR QUANT: CPT

## 2022-02-11 PROCEDURE — 2580000003 HC RX 258: Performed by: PHYSICIAN ASSISTANT

## 2022-02-11 PROCEDURE — 84550 ASSAY OF BLOOD/URIC ACID: CPT

## 2022-02-11 PROCEDURE — 85025 COMPLETE CBC W/AUTO DIFF WBC: CPT

## 2022-02-11 PROCEDURE — 85652 RBC SED RATE AUTOMATED: CPT

## 2022-02-11 PROCEDURE — 80053 COMPREHEN METABOLIC PANEL: CPT

## 2022-02-11 RX ORDER — MORPHINE SULFATE 4 MG/ML
4 INJECTION, SOLUTION INTRAMUSCULAR; INTRAVENOUS ONCE
Status: COMPLETED | OUTPATIENT
Start: 2022-02-11 | End: 2022-02-11

## 2022-02-11 RX ORDER — ALLOPURINOL 100 MG/1
100 TABLET ORAL DAILY
Qty: 30 TABLET | Refills: 0 | Status: SHIPPED | OUTPATIENT
Start: 2022-02-11 | End: 2022-03-13

## 2022-02-11 RX ORDER — 0.9 % SODIUM CHLORIDE 0.9 %
1000 INTRAVENOUS SOLUTION INTRAVENOUS ONCE
Status: COMPLETED | OUTPATIENT
Start: 2022-02-11 | End: 2022-02-11

## 2022-02-11 RX ADMIN — MORPHINE SULFATE 4 MG: 4 INJECTION, SOLUTION INTRAMUSCULAR; INTRAVENOUS at 15:23

## 2022-02-11 RX ADMIN — SODIUM CHLORIDE 1000 ML: 9 INJECTION, SOLUTION INTRAVENOUS at 16:00

## 2022-02-11 RX ADMIN — Medication 3 ML: at 16:00

## 2022-02-11 ASSESSMENT — PAIN SCALES - GENERAL
PAINLEVEL_OUTOF10: 5
PAINLEVEL_OUTOF10: 10
PAINLEVEL_OUTOF10: 10

## 2022-02-11 ASSESSMENT — PAIN DESCRIPTION - ORIENTATION: ORIENTATION: RIGHT

## 2022-02-11 ASSESSMENT — PAIN DESCRIPTION - PAIN TYPE: TYPE: ACUTE PAIN

## 2022-02-11 NOTE — ED PROVIDER NOTES
I independently performed a history and physical on [address] [place] [address]. All diagnostic, treatment, and disposition decisions were made by myself in conjunction with the advanced practice provider. I have participated in the medical decision making and directed the treatment plan and disposition of the patient. For further details of [address] [place] [address] emergency department encounter, please see the advanced practice provider's documentation. CHIEF COMPLAINT  Chief Complaint   Patient presents with    Knee Pain     right knee pain, started a couple weeks ago, seen at [place] where knee was was drained, put on prednisone and muscle relaxer and sent home pt is now having same issues       Briefly, [address] [place] [address] [place] is a 27 y.o. male  who presents to the ED complaining of atraumatic right knee pain and swelling. Recent arthrocentesis and completion of steroids for right knee pain. Reported history of gout. FOCUSED PHYSICAL EXAMINATION  BP (!) 111/58   Pulse 93   Temp 98.1 °F (36.7 °C) (Oral)   Resp 21   SpO2 98%      Focused physical examination:  General appearance:  Cooperative. No acute distress. Skin:  Warm. Dry. Eye:  Extraocular movements intact. Ears, nose, mouth and throat:  Oral mucosa moist,  Neck:  Trachea midline. Heart: Tachycardic but regular  Perfusion:  intact  Respiratory:  Lungs clear to auscultation bilaterally. Respirations nonlabored. Abdominal:   Non distended. Nontender  Neurological:  Alert and oriented x 3. Moves all extremities spontaneously  Musculoskeletal:   Mild soft tissue swelling of the right knee with obvious palpable effusion. No erythema or warmth to the right knee but patient does have significant pain with range of motion of the knee. No other joint abnormality          Psychiatric:  Normal mood    MDM: Patient presented to the emergency department today with right knee pain.   Has had history of pain and effusion in that knee in the past and recently was seen and had arthrocentesis performed for pain control and was started on a course of steroids for presumed gout. Here he is tachycardic. Found to have leukocytosis and mildly elevated ESR. While his knee is not red or warm he does have significant pain with range of motion and an arthrocentesis was performed under my direct supervision by the midlevel provider. Fluid was overall clear and yellow. 2625 nucleated cells. No obvious organisms on Gram stain. Discussed with orthopedics we overall have low clinical suspicion for septic arthritis. Patient will be given close outpatient follow-up but otherwise will be discharged home    During the patient's ED course, the patient was given:  Medications   morphine sulfate (PF) injection 4 mg (4 mg IntraVENous Given 2/11/22 1523)   lidocaine-EPINEPHrine-tetracaine (LET) topical solution 3 mL syringe (3 mLs Topical Given 2/11/22 1600)   0.9 % sodium chloride bolus (0 mLs IntraVENous Stopped 2/11/22 1719)        CLINICAL IMPRESSION  1. Right knee pain, unspecified chronicity    2. Effusion of right knee    3. History of gout        DISPOSITION  Home      This chart was created using Dragon dictation software. Efforts were made by me to ensure accuracy, however some errors may be present due to limitations of this technology.             Cornelio Blake MD  02/12/22 2013

## 2022-02-11 NOTE — ED PROVIDER NOTES
[facility]  ED  EMERGENCY DEPARTMENT ENCOUNTER        Pt Name: Ettie Carrel  MRN: [id number]  [place] 1995  Date of evaluation: 2/11/2022  Provider: BRIDGETT Alberts  PCP: No primary care provider on file. This patient was seen and evaluated by the attending physician Nanci Arevalo MD    [facility]       Chief Complaint   Patient presents with    Knee Pain     right knee pain, started a couple weeks ago, seen at [place] where knee was was drained, put on prednisone and muscle relaxer and sent home pt is now having same issues       HISTORY OF PRESENT ILLNESS   (Location/Symptom, Timing/Onset, Context/Setting, Quality, Duration, Modifying Factors, Severity)  Note limiting factors. Ettie Carrel is a 27 y.o. male who presents via private vehicle from his home for evaluation of right knee pain. Patient notes that last night his significantly acutely swollen and painful. He notes that this is very similar to what happened a couple weeks ago when his knee got very swollen and full of fluid. He presented to [facility] where he had a knee aspiration with copious drainage of clear serosanguineous fluid. Patient has history of gout he has had flares in his feet, he notes that this does not feel the way he has felt with an acute gout flare. He works at [id number] is often bending walking standing climbing. He denies any known acute injury. He notes that sometimes the knee feels weak. He has not been taking any medicine for the knee although this morning his mom did give him some \"pain pill\". It did not help significantly. He denies any fevers body aches chills nausea vomiting. He denies history of diabetes. He did not follow-up with orthopedics after his initial joint aspiration because he does not have insurance. Nursing Notes were all reviewed and agreed with or any disagreements were addressed  in the HPI.     Pt was seen during the COVID 19 pandemic. Appropriate PPE worn by ME during patient encounters. Pt seen during a time with constrained hospital bed capacity and other potential inpatient and outpatient resources were constrained due to the viral pandemic. REVIEW OF SYSTEMS    (2-9 systems for level 4, 10 or more for level 5)     Review of Systems    Positives and Pertinent negatives as per HPI. Except as noted abovein the ROS, all other systems were reviewed and negative. PAST MEDICAL HISTORY     Past Medical History:   Diagnosis Date    GERD (gastroesophageal reflux disease)     Gout     Headache     MIGRAINES         SURGICAL HISTORY   History reviewed. No pertinent surgical history. CURRENTMEDICATIONS       Previous Medications    COLCHICINE (COLCRYS) 0.6 MG TABLET    1 tablet twice a day until symptoms resolve    NAPROXEN (NAPROSYN) 500 MG TABLET    Take 1 tablet by mouth 2 times daily    PREDNISONE (DELTASONE) 20 MG TABLET    Take 3 tabs orally daily for 3 days, then take 2 tabs orally for 3 days then take 1 tab orally for 3 days. ALLERGIES     Patient has no known allergies. FAMILYHISTORY     History reviewed. No pertinent family history.        SOCIAL HISTORY       Social History     Socioeconomic History    Marital status: Single     Spouse name: None    Number of children: None    Years of education: None    Highest education level: None   Occupational History    None   Tobacco Use    Smoking status: Former Smoker     Packs/day: 0.50     Types: Cigars    Smokeless tobacco: Never Used   Substance and Sexual Activity    Alcohol use: Yes     Comment: occ    Drug use: No    Sexual activity: None   Other Topics Concern    None   Social History Narrative    None     Social Determinants of Health     Financial Resource Strain:     Difficulty of Paying Living Expenses: Not on file   Food Insecurity:     Worried About Running Out of Food in the Last Year: Not on file    [facility] in the Last Year: Not on file   Transportation Needs:     Lack of Transportation (Medical): Not on file    Lack of Transportation (Non-Medical): Not on file   Physical Activity:     Days of Exercise per Week: Not on file    Minutes of Exercise per Session: Not on file   Stress:     Feeling of Stress : Not on file   Social Connections:     Frequency of Communication with Friends and Family: Not on file    Frequency of Social Gatherings with Friends and Family: Not on file    Attends Congregation Services: Not on file    Active Member of [address] [place] [address] [employer] or Organizations: Not on file    Attends Club or Organization Meetings: Not on file    Marital Status: Not on file   Intimate Partner Violence:     Fear of Current or Ex-Partner: Not on file    Emotionally Abused: Not on file    Physically Abused: Not on file    Sexually Abused: Not on file   Housing Stability:     Unable to Pay for Housing in the Last Year: Not on file    Number of [place] in the Last Year: Not on file    Unstable Housing in the Last Year: Not on file       SCREENINGS             PHYSICAL EXAM    (up to 7 for level 4, 8 or more for level 5)     ED Triage Vitals   BP Temp Temp Source Pulse Resp SpO2 Height Weight   02/11/22 1454 02/11/22 1454 02/11/22 1454 02/11/22 1455 02/11/22 1454 02/11/22 1454 -- --   (!) 135/93 98.1 °F (36.7 °C) Oral 135 18 96 %         Physical Exam  PHYSICAL EXAM  BP (!) 147/98   Pulse 122   Temp 98.1 °F (36.7 °C) (Oral)   Resp 28   SpO2 96%   GENERAL APPEARANCE: Awake and alert. Cooperative. Adult male sitting upright in exam bed nondiaphoretic breathing comfortably on room air showed no sign of acute respiratory distress. In and evaluated in room 17. HEAD: Normocephalic. Atraumatic. EYES: PERRL. EOM's grossly intact. ENT: Mucous membranes are moist.. NECK: Supple. HEART: Tachycardic rate, regular rhythm. . No murmurs. Radial pulses 2+ symmetric, DP PT pulses 2+ symmetric. LUNGS: Respirations unlabored. CTAB. Good air exchange. Speaking comfortably in full sentences. EXTREMITIES: No peripheral edema. Moves all extremities equally. All extremities neurovascularly intact. Inspection of the right lower extremity reveals overall good bony alignment no gross deformity however there is significant joint effusion to the right knee, there is no overlying redness warmth. No lymphangitic streaking. No gross ligamentous instability. Range of motion is limited secondary to pain. Lower extremity calf compartments are soft. Dorsiflexion plantarflexion strength intact and symmetric. Sensation to light touch intact. Skin intact. SKIN: Warm and dry. No acute rashes. NEUROLOGICAL: Alert and oriented. CN grossly intact   No gross facial drooping. Power intact to UE and LE, sensation intact x 4. No tremors or ataxia. PSYCHIATRIC: Normal mood and affect. DIAGNOSTIC RESULTS   LABS:    Labs Reviewed   CBC WITH AUTO DIFFERENTIAL   COMPREHENSIVE METABOLIC PANEL W/ REFLEX TO MG FOR LOW K   SEDIMENTATION RATE   C-REACTIVE PROTEIN       All other labs were within normal range or not returned as of this dictation. EKG: All EKG's are interpreted by the Emergency Department Physician who either signs orCo-signs this chart in the absence of a cardiologist.  Please see their note for interpretation of EKG. RADIOLOGY:   Non-plain film images such as CT, Ultrasound and MRI are read by the radiologist. Plain radiographic images are visualized andpreliminarily interpreted by the  ED Provider with the below findings:        Interpretation perthe Radiologist below, if available at the time of this note:    XR KNEE RIGHT (1-2 VIEWS)   Final Result   No acute osseous abnormality      Nonspecific small joint effusion could indicate internal derangement           No results found.        PROCEDURES   Unless otherwise noted below, none     Arthrocentesis    Date/Time: 2/11/2022 4:18 PM  Performed by: BRIDGETT Mahoney  Authorized by: Freida Cisse MD     Consent:     Consent obtained:  Verbal    Consent given by:  Patient    Risks discussed:  Infection and pain    Alternatives discussed:  No treatment  Location:     Location:  Knee    Knee:  R knee  Anesthesia (see MAR for exact dosages): Anesthesia method:  Local infiltration    Local anesthetic:  Lidocaine 1% w/o epi  Procedure details:     Approach:  Lateral    Aspirate amount:  10cc    Aspirate characteristics:  Serous and yellow    Steroid injected: no      Specimen collected: yes    Post-procedure details:     Dressing:  Gauze roll and sterile dressing    Patient tolerance of procedure: Tolerated well, no immediate complications        CRITICAL CARE TIME   N/A    CONSULTS:  None      EMERGENCY DEPARTMENT COURSE and DIFFERENTIALDIAGNOSIS/MDM:   Vitals:    Vitals:    02/11/22 1454 02/11/22 1455 02/11/22 1502 02/11/22 1506   BP: (!) 135/93  (!) 147/98    Pulse:  135  122   Resp: 18   28   Temp: 98.1 °F (36.7 °C)      TempSrc: Oral      SpO2: 96%  97% 96%       Patient was given thefollowing medications:  Medications   morphine sulfate (PF) injection 4 mg (has no administration in time range)   lidocaine-EPINEPHrine-tetracaine (LET) topical solution 3 mL syringe (has no administration in time range)       PDMP Monitoring:    Last PDMP Kristina Carrillo [facility]):  Review User Review Instant Review Result          Last Controlled Substance Monitoring Documentation      ED from 3/15/2017 in [facility] ED   Periodic Controlled Substance Monitoring Potential drug abuse or diversion identified, see note documentation. filed at 03/16/2017 0102        Urine Drug Screenings (1 yr)    No resulted procedures found. Medication Contract and Consent for Opioid Use Documents Filed      No documents found                MDM:   Patient seen and evaluated. Old records reviewed. Diagnostic testing reviewed and results discussed. 21-year-old male presents for evaluation of knee pain. He has large joint effusion. He has active range of motion intact and he is afebrile given the appearance of the knee overall very low concern for septic arthritis however he is tachycardic and recently had a intra-articular introduction of needle foreign body for arthrocentesis several weeks ago therefore cannot completely rule out septic arthritis. For this reason diagnostic arthrocentesis was performed in the ER which revealed likely inflammatory effusion. I discussed the fluid analysis with orthopedics on-call Dr. My Fan notes that this analysis is not acutely concerning for septic arthritis. He did recommend adding on a serum uric acid level and adding on rheumatoid panel. Patient will be discharged home with Ace wrap, crutches, referral to Ortho and she was instructed to continue use of his allopurinol which he had not been using. At this time I believe he is reasonable candidate for discharge home with close follow-up and strict ER return precautions. Based on patient's clinical history and clinical findings I currently estimate there is low probability for: compartment syndrome, DVT, septic arthritis, dislocation, surgically emergent fracture, tendon or NV injury. We have discussed the symptoms which are most concerning (e.g., changing or worsening pain, numbness, weakness) that necessitate immediate return. Pt is in agreement with the current plan and all questions were addressed. Discharge Time out:  CC Reviewed Yes   Test Results Yes     Vitals:    02/11/22 1506   BP:    Pulse: 122   Resp: 28   Temp:    SpO2: 96%              FINAL IMPRESSION      1. Right knee pain, unspecified chronicity    2. Effusion of right knee    3. History of gout          DISPOSITION/PLAN   DISPOSITION        PATIENT REFERREDTO:  No follow-up provider specified.     DISCHARGE MEDICATIONS:  New Prescriptions    No medications on file       DISCONTINUED MEDICATIONS:  Discontinued Medications    No medications on file              (Please note that portions ofthis note were completed with a voice recognition program.  Efforts were made to edit the dictations but occasionally words are mis-transcribed.)    Rufus Barbosa (electronically signed)       Rufus Barbosa  02/11/22 2026

## 2022-02-12 LAB
C-REACTIVE PROTEIN: 34.7 MG/L (ref 0–5.1)
RHEUMATOID FACTOR: 13 IU/ML

## 2022-02-12 NOTE — CONSULTS
Placed call to Orthopedic Surgery @ [phone]  RE: knee aspirate, infection per BRIDGETT Phipps Dr. called back @ [phone]

## 2022-02-14 ENCOUNTER — TELEPHONE (OUTPATIENT)
Dept: ORTHOPEDIC SURGERY | Age: 27
End: 2022-02-14

## 2022-02-14 LAB — PATH CONSULT FLUID: NORMAL

## 2022-02-16 ENCOUNTER — TELEPHONE (OUTPATIENT)
Dept: ORTHOPEDIC SURGERY | Age: 27
End: 2022-02-16

## 2022-03-07 LAB
BODY FLUID CULTURE, STERILE: NORMAL
GRAM STAIN RESULT: NORMAL

## 2022-04-18 ENCOUNTER — APPOINTMENT (OUTPATIENT)
Dept: GENERAL RADIOLOGY | Age: 27
End: 2022-04-18

## 2022-04-18 ENCOUNTER — HOSPITAL ENCOUNTER (EMERGENCY)
Age: 27
Discharge: HOME | End: 2022-04-18

## 2022-04-18 VITALS
OXYGEN SATURATION: 97 % | HEIGHT: 71 IN | TEMPERATURE: 97 F | RESPIRATION RATE: 18 BRPM | SYSTOLIC BLOOD PRESSURE: 117 MMHG | HEART RATE: 78 BPM | DIASTOLIC BLOOD PRESSURE: 62 MMHG | BODY MASS INDEX: 36.4 KG/M2 | WEIGHT: 260 LBS

## 2022-04-18 DIAGNOSIS — M25.531 WRIST PAIN, ACUTE, RIGHT: Primary | ICD-10-CM

## 2022-04-18 PROCEDURE — 73110 X-RAY EXAM OF WRIST: CPT

## 2022-04-18 PROCEDURE — 6370000000 HC RX 637 (ALT 250 FOR IP): Performed by: NURSE PRACTITIONER

## 2022-04-18 PROCEDURE — 99285 EMERGENCY DEPT VISIT HI MDM: CPT

## 2022-04-18 RX ORDER — ACETAMINOPHEN 500 MG
1000 TABLET ORAL ONCE
Status: COMPLETED | OUTPATIENT
Start: 2022-04-18 | End: 2022-04-18

## 2022-04-18 RX ADMIN — ACETAMINOPHEN 1000 MG: 500 TABLET ORAL at 21:49

## 2022-04-18 ASSESSMENT — PAIN DESCRIPTION - ORIENTATION: ORIENTATION: RIGHT

## 2022-04-18 ASSESSMENT — PAIN DESCRIPTION - DESCRIPTORS: DESCRIPTORS: SHOOTING

## 2022-04-18 ASSESSMENT — PAIN SCALES - GENERAL
PAINLEVEL_OUTOF10: 10

## 2022-04-18 ASSESSMENT — PAIN - FUNCTIONAL ASSESSMENT: PAIN_FUNCTIONAL_ASSESSMENT: 0-10

## 2022-04-18 ASSESSMENT — PAIN DESCRIPTION - LOCATION: LOCATION: WRIST

## 2022-04-19 NOTE — ED NOTES
Patient discharged in stable, ambulatory condition with all documented belongings accompanied by friend. This nurse reviewed discharge instruction, pt verbalized understanding.        Joey Sexton RN  04/18/22 3703

## 2022-04-19 NOTE — ED PROVIDER NOTES
[address] ED  EMERGENCY DEPARTMENT ENCOUNTER        Pt Name: Jong Hager  MRN: [id number]  [place] 1995  Date of evaluation: 4/18/2022  Provider: BABS Varela CNP  PCP: No primary care provider on file. Note Started: 9:30 PM EDT       PENG. I have evaluated this patient. My supervising physician was available for consultation. CHIEF COMPLAINT       Chief Complaint   Patient presents with    Wrist Pain     shooting pain to right wrist starting yesterday; worse tonight        HISTORY OF PRESENT ILLNESS   (Location, Timing/Onset, Context/Setting, Quality, Duration, Modifying Factors, Severity, Associated Signs and Symptoms)  Note limiting factors. Chief Complaint: wrist pain     Jong Hager is a 27 y.o. male with medical history of GERD, gout, migraine headaches who presents the emergency department with complaints of pain into the right lateral breast since yesterday. He denies any known trauma, accidents, injuries, or falls. He does note he used to work at [employer], however recently quit his job and will start working doing [employer]. Unsure if there was possibly injury. Pain seems worse with movement he took 2 Motrin this afternoon without relief of symptoms. He denies any prior surgery or procedures to the right wrist.  Denies any associated numbness, tingling, redness, red streaks, bleeding. Nursing Notes were all reviewed and agreed with or any disagreements were addressed in the HPI. REVIEW OF SYSTEMS    (2-9 systems for level 4, 10 or more for level 5)     Review of Systems    Positives and Pertinent negatives as per HPI. Except as noted above in the ROS, all other systems were reviewed and negative. PAST MEDICAL HISTORY     Past Medical History:   Diagnosis Date    GERD (gastroesophageal reflux disease)     Gout     Headache     MIGRAINES         SURGICAL HISTORY   History reviewed.  No pertinent surgical history. CURRENTMEDICATIONS       Previous Medications    ALLOPURINOL (ZYLOPRIM) 100 MG TABLET    Take 1 tablet by mouth daily    COLCHICINE (COLCRYS) 0.6 MG TABLET    1 tablet twice a day until symptoms resolve    NAPROXEN (NAPROSYN) 500 MG TABLET    Take 1 tablet by mouth 2 times daily    PREDNISONE (DELTASONE) 20 MG TABLET    Take 3 tabs orally daily for 3 days, then take 2 tabs orally for 3 days then take 1 tab orally for 3 days. ALLERGIES     Patient has no known allergies. FAMILYHISTORY     History reviewed. No pertinent family history. SOCIAL HISTORY       Social History     Tobacco Use    Smoking status: Former Smoker     Packs/day: 0.50     Types: Cigars    Smokeless tobacco: Never Used   Substance Use Topics    Alcohol use: Yes     Comment: occ    Drug use: No       SCREENINGS    [place] Coma Scale  Eye Opening: Spontaneous  Best Verbal Response: Oriented  Best Motor Response: Obeys commands  [place] Coma Scale Score: 15        PHYSICAL EXAM    (up to 7 for level 4, 8 or more for level 5)     ED Triage Vitals   BP Temp Temp Source Pulse Resp SpO2 Height Weight   04/18/22 2125 04/18/22 2124 04/18/22 2124 04/18/22 2124 04/18/22 2124 04/18/22 2124 04/18/22 2124 04/18/22 2124   (!) 150/87 97 °F (36.1 °C) Oral 89 18 99 % 5' 11\" (1.803 m) 260 lb (117.9 kg)       Physical Exam  Vitals and nursing note reviewed. Constitutional:       General: He is awake. Appearance: Normal appearance. He is well-developed. He is morbidly obese. HENT:      Head: Normocephalic and atraumatic. Nose: Nose normal.   Eyes:      General:         Right eye: No discharge. Left eye: No discharge. Cardiovascular:      Pulses:           Radial pulses are 2+ on the right side and 2+ on the left side. Pulmonary:      Effort: Pulmonary effort is normal. No respiratory distress.    Musculoskeletal:      Right forearm: Normal.      Left forearm: Normal.      Right wrist: Tenderness (ulnar) present. No swelling, deformity, effusion, lacerations or bony tenderness. Decreased range of motion (flexion secondary to pain). Left wrist: No swelling or tenderness. Right hand: Normal.      Left hand: Normal.      Cervical back: Normal range of motion. Skin:     General: Skin is warm and dry. Coloration: Skin is not pale. Neurological:      General: No focal deficit present. Mental Status: He is alert and oriented to person, place, and time. Sensory: Sensation is intact. Motor: Motor function is intact. Psychiatric:         Behavior: Behavior normal. Behavior is cooperative. DIAGNOSTIC RESULTS   LABS:    Labs Reviewed - No data to display    When ordered only abnormal lab results are displayed. All other labs were within normal range or not returned as of this dictation. EKG: When ordered, EKG's are interpreted by the Emergency Department Physician in the absence of a cardiologist.  Please see their note for interpretation of EKG. RADIOLOGY:   Non-plain film images such as CT, Ultrasound and MRI are read by the radiologist. Plain radiographic images are visualized and preliminarily interpreted by the ED Provider with the below findings:        Interpretation per the Radiologist below, if available at the time of this note:    XR WRIST RIGHT (MIN 3 VIEWS)   Final Result   1. No acute fracture or malalignment. No results found.         PROCEDURES   Unless otherwise noted below, none     Procedures    CRITICAL CARE TIME       CONSULTS:  None      EMERGENCY DEPARTMENT COURSE and DIFFERENTIAL DIAGNOSIS/MDM:   Vitals:    Vitals:    04/18/22 2124 04/18/22 2125   BP:  (!) 150/87   Pulse: 89    Resp: 18    Temp: 97 °F (36.1 °C)    TempSrc: Oral    SpO2: 99%    Weight: 260 lb (117.9 kg)    Height: 5' 11\" (1.803 m)        Patient was given the following medications:  Medications   acetaminophen (TYLENOL) tablet 1,000 mg (1,000 mg Oral Given 4/18/22 2149) Care of this patient took place during the COVID-19 pandemic emergency. ED COURSE & MEDICAL DECISION MAKING    - The patient presented to the ER with complaints of  right wrist pain. Vital signs were reviewed. Exam well-developed, well-nourished male who appears uncomfortable. Imaging ordered. - Pertinent Labs & Imaging studies reviewed. (See chart for details)   -  Patient seen and evaluated in the emergency department. -  Triage and nursing notes reviewed and incorporated. -  Old chart records reviewed and incorporated. -  PENG. I have evaluated this patient. My supervising physician was available for consultation.  -  Differential diagnosis includes:  Sprain, strain, fracture, contusion, arthritis, gout, septic arthritis, versus COVID-19  -  Work-up included:  See above  -  ED treatment included:   Ice, Tylenol, resplinted  -  Results discussed with patient. Artem Mahoney is a 62-year-old male with complaints of pain into the right wrist for the past 2 days. He denies any known accident or injuries. Pain persists and is worse with movement. Denies any associated numbness, tingling, weakness. On exam bilateral radial pulse 2+. Cap refill less than 2. Right wrist ulnar aspect there is tenderness to palpate. Decreased range of motion with flexion secondary to pain. No finger thumb opposition difficulty. No redness, bleeding, swelling, or deformity noted. Imaging is obtained. X-ray right wrist shows no acute fracture or malalignment. Patient was informed of these results. He was given ice pack and fitted for a wrist splint. He was instructed on home treatment and care. Shared decision making is complete and patient stable for discharge at this time    FINAL IMPRESSION      1.  Wrist pain, acute, right          DISPOSITION/PLAN   DISPOSITION        PATIENT REFERRED TO:  [facility]) Pre-Services  [phone]  Call in 2 days  As needed, If symptoms worsen    [facility] ED  [address]  Go to   As needed      DISCHARGE MEDICATIONS:  New Prescriptions    DICLOFENAC (VOLTAREN) 50 MG EC TABLET    Take 1 tablet by mouth 2 times daily for 20 doses       DISCONTINUED MEDICATIONS:  Discontinued Medications    No medications on file              (Please note that portions of this note were completed with a voice recognition program.  Efforts were made to edit the dictations but occasionally words are mis-transcribed.)    BABS Watt CNP (electronically signed)            BABS Watt CNP  04/19/22 0008

## 2022-09-21 ENCOUNTER — HOSPITAL ENCOUNTER (EMERGENCY)
Age: 27
Discharge: HOME | End: 2022-09-21

## 2022-09-21 VITALS
HEART RATE: 87 BPM | TEMPERATURE: 97.9 F | OXYGEN SATURATION: 97 % | SYSTOLIC BLOOD PRESSURE: 141 MMHG | RESPIRATION RATE: 16 BRPM | DIASTOLIC BLOOD PRESSURE: 85 MMHG | BODY MASS INDEX: 35 KG/M2 | WEIGHT: 250 LBS | HEIGHT: 71 IN

## 2022-09-21 DIAGNOSIS — G89.29 ACUTE EXACERBATION OF CHRONIC LOW BACK PAIN: ICD-10-CM

## 2022-09-21 DIAGNOSIS — M54.50 ACUTE EXACERBATION OF CHRONIC LOW BACK PAIN: ICD-10-CM

## 2022-09-21 DIAGNOSIS — S39.012A STRAIN OF LUMBAR REGION, INITIAL ENCOUNTER: Primary | ICD-10-CM

## 2022-09-21 PROCEDURE — 99284 EMERGENCY DEPT VISIT MOD MDM: CPT

## 2022-09-21 PROCEDURE — 6360000002 HC RX W HCPCS: Performed by: NURSE PRACTITIONER

## 2022-09-21 PROCEDURE — 6370000000 HC RX 637 (ALT 250 FOR IP): Performed by: NURSE PRACTITIONER

## 2022-09-21 PROCEDURE — 96372 THER/PROPH/DIAG INJ SC/IM: CPT

## 2022-09-21 RX ORDER — CYCLOBENZAPRINE HCL 10 MG
10 TABLET ORAL 3 TIMES DAILY PRN
Qty: 21 TABLET | Refills: 0 | Status: SHIPPED | OUTPATIENT
Start: 2022-09-21 | End: 2022-10-01

## 2022-09-21 RX ORDER — KETOROLAC TROMETHAMINE 30 MG/ML
30 INJECTION, SOLUTION INTRAMUSCULAR; INTRAVENOUS ONCE
Status: COMPLETED | OUTPATIENT
Start: 2022-09-21 | End: 2022-09-21

## 2022-09-21 RX ORDER — PREDNISONE 20 MG/1
40 TABLET ORAL ONCE
Status: COMPLETED | OUTPATIENT
Start: 2022-09-21 | End: 2022-09-21

## 2022-09-21 RX ORDER — NAPROXEN 500 MG/1
500 TABLET ORAL 2 TIMES DAILY
Qty: 20 TABLET | Refills: 0 | Status: SHIPPED | OUTPATIENT
Start: 2022-09-21 | End: 2022-10-07

## 2022-09-21 RX ORDER — PREDNISONE 20 MG/1
TABLET ORAL
Qty: 18 TABLET | Refills: 0 | Status: SHIPPED | OUTPATIENT
Start: 2022-09-21 | End: 2022-11-04 | Stop reason: ALTCHOICE

## 2022-09-21 RX ADMIN — PREDNISONE 40 MG: 20 TABLET ORAL at 11:32

## 2022-09-21 RX ADMIN — KETOROLAC TROMETHAMINE 30 MG: 30 INJECTION, SOLUTION INTRAMUSCULAR; INTRAVENOUS at 11:32

## 2022-09-21 ASSESSMENT — ENCOUNTER SYMPTOMS
DIARRHEA: 0
COUGH: 0
ABDOMINAL PAIN: 0
SHORTNESS OF BREATH: 0
RHINORRHEA: 0
SORE THROAT: 0
BLOOD IN STOOL: 0
NAUSEA: 0
EYE PAIN: 0
VOMITING: 0
BACK PAIN: 1

## 2022-09-21 ASSESSMENT — PAIN DESCRIPTION - DESCRIPTORS: DESCRIPTORS: SHOOTING

## 2022-09-21 ASSESSMENT — PAIN SCALES - GENERAL: PAINLEVEL_OUTOF10: 8

## 2022-09-21 ASSESSMENT — PAIN - FUNCTIONAL ASSESSMENT: PAIN_FUNCTIONAL_ASSESSMENT: 0-10

## 2022-09-21 ASSESSMENT — PAIN DESCRIPTION - LOCATION: LOCATION: BACK

## 2022-09-21 NOTE — Clinical Note
Charity Cooper was seen and treated in our emergency department on 9/21/2022. He may return to work on 09/23/2022. If you have any questions or concerns, please don't hesitate to call.       Kell Valles, BABS - CNP

## 2022-09-21 NOTE — ED NOTES
Patient discharged in stable, ambulatory condition with all documented belongings. This nurse reviewed discharge instructions, pt verbalized understanding.        Raheem Garrett RN  09/21/22 8237

## 2022-09-21 NOTE — ED PROVIDER NOTES
[address] ED  EMERGENCY DEPARTMENT ENCOUNTER        Pt Name: Fabi Maciel  MRN: [id number]  [place] 1995  Date of evaluation: 9/21/2022  Provider: BABS Hood CNP  PCP: No primary care provider on file. Note Started: 11:24 AM EDT      PENG. I have evaluated this patient. My supervising physician was available for consultation. Triage CHIEF COMPLAINT       Chief Complaint   Patient presents with    Back Pain     Lower back pain that shoots down into his right hip and right leg for 1 day. HISTORY OF PRESENT ILLNESS   (Location/Symptom, Timing/Onset, Context/Setting, Quality, Duration, Modifying Factors, Severity)  Note limiting factors. Chief Complaint: Low back pain    Fabi Maciel is a 24 y.o. male who presents to the emerged part with symptoms of low back pain. Patient reported symptoms of low back pain that began approximately 2 to 3 days ago. He states that he works [employer] and lifts heavy throughout the day. He states that he believes he strained his back. He is done this time before and was she had significant relief with prednisone, cyclobenzaprine, and naproxen. He states that he requesting those medications again. He states that he does not have insurance has not followed up with a family doctor for this problem yet. He denies any numbness or tingling. No loss of bowel or bladder function. No unexplained fevers or weight loss. He states that his pain is more on the right low back. Denies any midline back tenderness. No weakness in his lower extremities. Reports only pain. Pain worse with range of motion and movement. No abdominal pain or chest pain. No other acute complaints. Pain is described as sharp. Nursing Notes were all reviewed and agreed with or any disagreements were addressed in the HPI.     REVIEW OF SYSTEMS    (2-9 systems for level 4, 10 or more for level 5)     Review of Systems   Constitutional: Negative for chills, diaphoresis and fever. HENT:  Negative for congestion, ear pain, rhinorrhea and sore throat. Eyes:  Negative for pain and visual disturbance. Respiratory:  Negative for cough and shortness of breath. Cardiovascular:  Negative for chest pain and leg swelling. Gastrointestinal:  Negative for abdominal pain, blood in stool, diarrhea, nausea and vomiting. Genitourinary:  Negative for difficulty urinating, dysuria, flank pain and frequency. Musculoskeletal:  Positive for back pain. Negative for neck pain. Skin:  Negative for rash and wound. Neurological:  Negative for dizziness and light-headedness. PAST MEDICAL HISTORY     Past Medical History:   Diagnosis Date    GERD (gastroesophageal reflux disease)     Gout     Headache     MIGRAINES       SURGICAL HISTORY   History reviewed. No pertinent surgical history. CURRENTMEDICATIONS       Previous Medications    ALLOPURINOL (ZYLOPRIM) 100 MG TABLET    Take 1 tablet by mouth daily    COLCHICINE (COLCRYS) 0.6 MG TABLET    1 tablet twice a day until symptoms resolve    DICLOFENAC (VOLTAREN) 50 MG EC TABLET    Take 1 tablet by mouth 2 times daily for 20 doses       ALLERGIES     Patient has no known allergies. FAMILYHISTORY     History reviewed. No pertinent family history.      SOCIAL HISTORY       Social History     Socioeconomic History    Marital status: Single     Spouse name: None    Number of children: None    Years of education: None    Highest education level: None   Tobacco Use    Smoking status: Former     Packs/day: 0.50     Types: Cigars, Cigarettes    Smokeless tobacco: Never   Vaping Use    Vaping Use: Every day    Substances: Nicotine   Substance and Sexual Activity    Alcohol use: Not Currently     Comment: occ    Drug use: No       SCREENINGS    [place] Coma Scale  Eye Opening: Spontaneous  Best Verbal Response: Oriented  Best Motor Response: Obeys commands  [place] Coma Scale Score: 15        PHYSICAL EXAM (up to 7 for level 4, 8 or more for level 5)     ED Triage Vitals [09/21/22 1109]   BP Temp Temp Source Heart Rate Resp SpO2 Height Weight   (!) 147/82 97.9 °F (36.6 °C) Oral 98 16 98 % 5' 11\" (1.803 m) 250 lb (113.4 kg)       Physical Exam  Vitals and nursing note reviewed. Constitutional:       Appearance: Normal appearance. He is not toxic-appearing or diaphoretic. HENT:      Head: Normocephalic and atraumatic. Nose: Nose normal.   Eyes:      General:         Right eye: No discharge. Left eye: No discharge. Cardiovascular:      Rate and Rhythm: Normal rate and regular rhythm. Pulses: Normal pulses. Heart sounds: No murmur heard. Pulmonary:      Effort: Pulmonary effort is normal. No respiratory distress. Breath sounds: No wheezing or rhonchi. Musculoskeletal:         General: Normal range of motion. Cervical back: Normal range of motion and neck supple. Right lower leg: No edema. Left lower leg: No edema. Skin:     General: Skin is warm and dry. Neurological:      General: No focal deficit present. Mental Status: He is alert and oriented to person, place, and time. GCS: GCS eye subscore is 4. GCS verbal subscore is 5. GCS motor subscore is 6. Cranial Nerves: No dysarthria or facial asymmetry. Sensory: Sensation is intact. No sensory deficit. Motor: Motor function is intact. No weakness or pronator drift. Coordination: Coordination is intact. Finger-Nose-Finger Test and Heel to [facility] Test normal.      Gait: Gait normal.      Deep Tendon Reflexes:      Reflex Scores:       Patellar reflexes are 2+ on the right side and 2+ on the left side. Comments: 5 out of 5 strength in the upper and lower extremities. No numbness no tingling. No sensory loss.    Psychiatric:         Mood and Affect: Mood normal.         Behavior: Behavior normal.       DIAGNOSTIC RESULTS   LABS:    Labs Reviewed - No data to display    When ordered, only abnormal lab results are displayed. All other labs were within normal range or not returned as of this dictation. EKG: When ordered, EKG's are interpreted by the Emergency Department Physician in the absence of a cardiologist.  Please see their note for interpretation of EKG. RADIOLOGY:   Non-plain film images such as CT, Ultrasound and MRI are read by the radiologist. Plain radiographic images are visualized andpreliminarily interpreted by the  ED Provider with the below findings:        Interpretation [facility] Radiologist below, if available at the time of this note:    No orders to display     No results found. PROCEDURES   Unless otherwise noted below, none     Procedures    CRITICAL CARE TIME   N/A    CONSULTS:  None      EMERGENCY DEPARTMENT COURSE and DIFFERENTIAL DIAGNOSIS/MDM:   Vitals:    Vitals:    09/21/22 1109   BP: (!) 147/82   Pulse: 98   Resp: 16   Temp: 97.9 °F (36.6 °C)   TempSrc: Oral   SpO2: 98%   Weight: 250 lb (113.4 kg)   Height: 5' 11\" (1.803 m)       Patient was given thefollowing medications:  Medications   ketorolac (TORADOL) injection 30 mg (has no administration in time range)   predniSONE (DELTASONE) tablet 40 mg (has no administration in time range)         Is this patient to be included in the SEP-1 Core Measure due to severe sepsis or septic shock? No   Exclusion criteria - the patient is NOT to be included for SEP-1 Core Measure due to: Infection is not suspected    Patient be discharged home in good condition. Patient appears well nontoxic. Patient advised follow with him doctor next 2 to 3 days for repeat evaluation of his low back. We will go and provide him referral to family doctor. As well as medications including prednisone, naproxen, Flexeril for his back pain relief. He has been advised to return back to the ED for any further acute concerns. Also advised to follow-up with both family doctor and we will go and provide him a follow-up with Ortho.   He has no other acute complaints noted at this time. His blood pressure mildly elevated 147/82 and he has been advised to follow-up with his family doctor about his elevated blood pressure reading. No fever. No evidence of cauda equina syndrome. Low suspicion for further acute etiology. I am the Primary Clinician of Record. FINAL IMPRESSION      1. Strain of lumbar region, initial encounter    2.  Acute exacerbation of chronic low back pain          DISPOSITION/PLAN   DISPOSITION Decision To Discharge 09/21/2022 11:28:10 AM      PATIENT REFERREDTO:  Piedad Jennings  [phone]  Schedule an appointment as soon as possible for a visit       [place] [facility] [place] [facility] [place] [facility]  [address] [facility] 53  Go to   If symptoms worsen    DISCHARGE MEDICATIONS:  New Prescriptions    CYCLOBENZAPRINE (FLEXERIL) 10 MG TABLET    Take 1 tablet by mouth 3 times daily as needed for Muscle spasms       DISCONTINUED MEDICATIONS:  Discontinued Medications    No medications on file              (Please note that portions ofthis note were completed with a voice recognition program.  Efforts were made to edit the dictations but occasionally words are mis-transcribed.)    BABS Hood CNP (electronically signed)             BABS Hood CNP  09/21/22 1129

## 2022-09-21 NOTE — Clinical Note
Nevin Sumner was seen and treated in our emergency department on 9/21/2022. He may return to work on 09/23/2022. If you have any questions or concerns, please don't hesitate to call.       Lindsay Cahcko, BABS - CNP

## 2022-10-07 ENCOUNTER — HOSPITAL ENCOUNTER (EMERGENCY)
Age: 27
Discharge: HOME | End: 2022-10-07

## 2022-10-07 VITALS
TEMPERATURE: 97.7 F | WEIGHT: 250 LBS | HEIGHT: 71 IN | SYSTOLIC BLOOD PRESSURE: 177 MMHG | OXYGEN SATURATION: 99 % | BODY MASS INDEX: 35 KG/M2 | HEART RATE: 89 BPM | RESPIRATION RATE: 16 BRPM | DIASTOLIC BLOOD PRESSURE: 106 MMHG

## 2022-10-07 DIAGNOSIS — K04.7 DENTAL INFECTION: Primary | ICD-10-CM

## 2022-10-07 DIAGNOSIS — K04.7 DENTAL ABSCESS: ICD-10-CM

## 2022-10-07 PROCEDURE — 99284 EMERGENCY DEPT VISIT MOD MDM: CPT

## 2022-10-07 PROCEDURE — 6370000000 HC RX 637 (ALT 250 FOR IP): Performed by: PHYSICIAN ASSISTANT

## 2022-10-07 RX ORDER — NAPROXEN 500 MG/1
500 TABLET ORAL 2 TIMES DAILY WITH MEALS
Qty: 20 TABLET | Refills: 0 | Status: SHIPPED | OUTPATIENT
Start: 2022-10-07 | End: 2022-11-04 | Stop reason: ALTCHOICE

## 2022-10-07 RX ORDER — AMOXICILLIN AND CLAVULANATE POTASSIUM 875; 125 MG/1; MG/1
1 TABLET, FILM COATED ORAL ONCE
Status: COMPLETED | OUTPATIENT
Start: 2022-10-07 | End: 2022-10-07

## 2022-10-07 RX ORDER — CHLORHEXIDINE GLUCONATE 0.12 MG/ML
15 RINSE ORAL 2 TIMES DAILY
Qty: 420 ML | Refills: 0 | Status: SHIPPED | OUTPATIENT
Start: 2022-10-07 | End: 2022-10-21

## 2022-10-07 RX ORDER — AMOXICILLIN AND CLAVULANATE POTASSIUM 875; 125 MG/1; MG/1
1 TABLET, FILM COATED ORAL 2 TIMES DAILY
Qty: 20 TABLET | Refills: 0 | Status: SHIPPED | OUTPATIENT
Start: 2022-10-07 | End: 2022-10-17

## 2022-10-07 RX ORDER — LIDOCAINE HYDROCHLORIDE 20 MG/ML
JELLY TOPICAL ONCE
Status: DISCONTINUED | OUTPATIENT
Start: 2022-10-07 | End: 2022-10-08 | Stop reason: HOSPADM

## 2022-10-07 RX ORDER — HYDROCODONE BITARTRATE AND ACETAMINOPHEN 5; 325 MG/1; MG/1
1 TABLET ORAL ONCE
Status: COMPLETED | OUTPATIENT
Start: 2022-10-07 | End: 2022-10-07

## 2022-10-07 RX ORDER — CHLORHEXIDINE GLUCONATE 0.12 MG/ML
15 RINSE ORAL ONCE
Status: COMPLETED | OUTPATIENT
Start: 2022-10-07 | End: 2022-10-07

## 2022-10-07 RX ADMIN — HYDROCODONE BITARTRATE AND ACETAMINOPHEN 1 TABLET: 5; 325 TABLET ORAL at 21:28

## 2022-10-07 RX ADMIN — 0.12% CHLORHEXIDINE GLUCONATE 15 ML: 1.2 RINSE ORAL at 21:44

## 2022-10-07 RX ADMIN — BENZOCAINE: 220 SPRAY, METERED PERIODONTAL at 21:56

## 2022-10-07 RX ADMIN — AMOXICILLIN AND CLAVULANATE POTASSIUM 1 TABLET: 875; 125 TABLET, FILM COATED ORAL at 21:28

## 2022-10-07 ASSESSMENT — PAIN DESCRIPTION - LOCATION
LOCATION: JAW

## 2022-10-07 ASSESSMENT — PAIN SCALES - GENERAL
PAINLEVEL_OUTOF10: 5
PAINLEVEL_OUTOF10: 10
PAINLEVEL_OUTOF10: 4
PAINLEVEL_OUTOF10: 10

## 2022-10-07 ASSESSMENT — PAIN DESCRIPTION - PAIN TYPE: TYPE: ACUTE PAIN

## 2022-10-07 ASSESSMENT — PAIN - FUNCTIONAL ASSESSMENT
PAIN_FUNCTIONAL_ASSESSMENT: NONE - DENIES PAIN
PAIN_FUNCTIONAL_ASSESSMENT: NONE - DENIES PAIN
PAIN_FUNCTIONAL_ASSESSMENT: ACTIVITIES ARE NOT PREVENTED

## 2022-10-07 ASSESSMENT — PAIN DESCRIPTION - ORIENTATION
ORIENTATION: RIGHT

## 2022-10-07 ASSESSMENT — PAIN DESCRIPTION - FREQUENCY: FREQUENCY: CONTINUOUS

## 2022-10-07 ASSESSMENT — PAIN DESCRIPTION - ONSET: ONSET: ON-GOING

## 2022-10-07 ASSESSMENT — PAIN DESCRIPTION - DESCRIPTORS
DESCRIPTORS: ACHING

## 2022-10-08 NOTE — DISCHARGE INSTRUCTIONS
Dental Emergency Referrals    [address] [facility] residents only)    [facility] AT [place]  [address] [phone]   [facility].  [phone]   [facility]  [address]  [phone]   [facility] [place]  (entrance on [address]. [address] [facility]  [address]  [phone]   [facility].  [phone]   [facility]  [address]  [phone]       [address] [facility]  [address] [facility].  [phone]   [facility]  [address]  [phone]     [facility]  [address] [facility] 2nd floor  [phone]   [facility]  [address] [phone] ([address] [facility])  [place]: [address]: [address]  [phone]   [address] [facility]/ [address]  [phone]     [facility]  [address]  [phone]   [address]  111 [facility].  Oral Surgery Dept: [phone]  Dental Clinic: [address] [facility]  [phone]     [address] [phone]   Urgent Dental Care   [address] [place], [address]  [place] : [address] : [phone]     [facility]  [address]    Other [address] in the area    [address] [facility] ([place] (Dental Urgent Care)  [facility]  (Across from COMMUNITY [facility], [address]  [phone]?       Pediatric Only Dentists    [address]   Up to age 21  [address]  Up to age 24  [place]: [place]   [phone] or [address] [facility] [address]: [address] [place] [address]  Up to age 12  Dat 1958 [phone]    An abscess of the tooth is a pus-filled sack at the root of the tooth. It usually develops in a decaying tooth or in the presence of severe gum disease. Severe gum disease allows bacteria to invade the tooth. The abscess causes a persistent ache or throbbing and is extremely painful when pressure is applied to the involved tooth through chewing or touch. A tooth abscess is likely to develop at some time for any one who does not properly care for his/her teeth. If left untreated, the pocket of pus can continue to grow and eventually burst inside the mouth releasing the foul tasting pus. Your dentist needs to remove the infected and decayed area of the tooth to cure the problem. Home Care:    1. See your dentist as soon as possible    2. Treatment by your dentist may consist of pain medication and an antibiotic to prevent the spread of infection. However, medication alone will NOT cure the problem. If your doctor prescribes pain medication, do not drive, drink alcohol or operate hazardous machinery while taking the medicine. 3. Frequently rinse your mouth (every hour) with warm salt water ( 1 tablespoon table salt mixed in 8 oz glass of warm water). 4. If the abscess bursts or was opened and drained in our Emergency Department, rinse the pus away with salt water and perform salt water gargles several times daily. .    Promptly return to the Emergency Department or call your doctor if any of the following occur:      * Extreme swelling develops or facial redness. * You develop a fever over 101° F.      * You have any difficulty swallowing or breathing.

## 2022-10-10 NOTE — ED PROVIDER NOTES
[address] ED  EMERGENCY DEPARTMENT ENCOUNTER        Pt Name: Jerry Mack  MRN: [id number]  [place] 1995  Date of evaluation: 10/7/2022  Provider: BRIDGETT Gupta  PCP: No primary care provider on file. This patient was not seen and evaluated by the attending physician No att. providers found. I have evaluated this patient. My supervising physician was available for consultation. CHIEF COMPLAINT       Chief Complaint   Patient presents with    Dental Injury     Chipped tooth approx 4 months ago; pain developed today       HISTORY OF PRESENT ILLNESS   (Location/Symptom, Timing/Onset, Context/Setting, Quality, Duration, Modifying Factors, Severity)  Note limiting factors. Jerry Mack is a 24 y.o. male who presents via private vehicle from his home for evaluation of dental pain. Patient notes that he has had a chipped tooth for the last 4 months however he notes that today the pain around the tooth has been more severe than it ever has been. He denies any fevers body aches chills nausea vomiting abdominal pain. Denies any head or neck pain, denies any difficulty fully opening or closing the mouth. Denies any problems swallowing. He has not recently been on any antibiotics. He has no other acute concerns or complaints at this time. Nursing Notes were all reviewed and agreed with or any disagreements were addressed  in the HPI. Pt was seen during the [place] 19 pandemic. Appropriate PPE worn by ME during patient encounters. Pt seen during a time with constrained hospital bed capacity and other potential inpatient and outpatient resources were constrained due to the viral pandemic. REVIEW OF SYSTEMS    (2-9 systems for level 4, 10 or more for level 5)     Review of Systems    Positives and Pertinent negatives as per HPI. Except as noted abovein the ROS, all other systems were reviewed and negative.        PAST MEDICAL HISTORY     Past Medical History:   Diagnosis Date    GERD (gastroesophageal reflux disease)     Gout     Headache     MIGRAINES         SURGICAL HISTORY   History reviewed. No pertinent surgical history. [address]       Discharge Medication List as of 10/7/2022 10:38 PM        CONTINUE these medications which have NOT CHANGED    Details   predniSONE (DELTASONE) 20 MG tablet Take 3 tabs orally daily for 3 days, then take 2 tabs orally for 3 days then take 1 tab orally for 3 days. , Disp-18 tablet, R-0Normal      diclofenac (VOLTAREN) 50 MG EC tablet Take 1 tablet by mouth 2 times daily for 20 doses, Disp-20 tablet, R-0Print      allopurinol (ZYLOPRIM) 100 MG tablet Take 1 tablet by mouth daily, Disp-30 tablet, R-0Normal      colchicine (COLCRYS) 0.6 MG tablet 1 tablet twice a day until symptoms resolve, Disp-30 tablet, R-0Print               ALLERGIES     Patient has no known allergies. FAMILYHISTORY     History reviewed. No pertinent family history.        SOCIAL HISTORY       Social History     Socioeconomic History    Marital status: Single     Spouse name: None    Number of children: None    Years of education: None    Highest education level: None   Tobacco Use    Smoking status: Former     Packs/day: 0.50     Types: Cigars, Cigarettes    Smokeless tobacco: Never   Vaping Use    Vaping Use: Every day    Substances: Nicotine   Substance and Sexual Activity    Alcohol use: Not Currently     Comment: occ    Drug use: No       SCREENINGS    [place] Coma Scale  Eye Opening: Spontaneous  Best Verbal Response: Oriented  Best Motor Response: Obeys commands  [place] Coma Scale Score: 15        PHYSICAL EXAM    (up to 7 for level 4, 8 or more for level 5)     ED Triage Vitals   BP Temp Temp Source Heart Rate Resp SpO2 Height Weight   10/07/22 2002 10/07/22 2002 10/07/22 2002 10/07/22 2002 10/07/22 2002 10/07/22 2003 10/07/22 2003 10/07/22 2003   (!) 177/106 97.7 °F (36.5 °C) Oral (!) 101 16 99 % 5' 11\" (1.803 m) 250 lb (113.4 kg)       Physical Exam  Vitals and nursing note reviewed. Constitutional:       General: He is awake. He is not in acute distress. Appearance: Normal appearance. He is well-developed. He is not ill-appearing, toxic-appearing or diaphoretic. HENT:      Head: Normocephalic and atraumatic. No right periorbital erythema or left periorbital erythema. Jaw: There is normal jaw occlusion. No trismus, tenderness, swelling, pain on movement or malocclusion. Right Ear: External ear normal.      Left Ear: External ear normal.      Nose: Nose normal.      Mouth/Throat:      Lips: Pink. No lesions. Mouth: Mucous membranes are moist. No oral lesions or angioedema. Dentition: Gingival swelling and dental caries present. No dental abscesses. Tongue: No lesions. Tongue does not deviate from midline. Palate: No mass and lesions. Pharynx: Oropharynx is clear. Uvula midline. Tonsils: No tonsillar exudate. Comments: Floor of mouth is soft, no trismus no angioedema, tolerating oral secretions appropriately. Eyes:      General:         Right eye: No discharge. Left eye: No discharge. Cardiovascular:      Rate and Rhythm: Normal rate and regular rhythm. Heart sounds: Normal heart sounds. No murmur heard. No friction rub. No gallop. Pulmonary:      Effort: Pulmonary effort is normal. No respiratory distress. Breath sounds: Normal breath sounds. No wheezing or rales. Musculoskeletal:      Cervical back: Normal range of motion and neck supple. No rigidity. Right lower leg: No edema. Left lower leg: No edema. Lymphadenopathy:      Cervical: No cervical adenopathy. Skin:     General: Skin is warm and dry. Capillary Refill: Capillary refill takes less than 2 seconds. Neurological:      General: No focal deficit present. Mental Status: He is alert, oriented to person, place, and time and easily aroused. Motor: No weakness. Gait: Gait normal.   Psychiatric:         Mood and Affect: Mood normal.         Behavior: Behavior normal. Behavior is cooperative. PHYSICAL EXAM  BP (!) 177/106   Pulse 89   Temp 97.7 °F (36.5 °C) (Oral)   Resp 16   Ht 5' 11\" (1.803 m)   Wt 250 lb (113.4 kg)   SpO2 99%   BMI 34.87 kg/m²     DIAGNOSTIC RESULTS   LABS:    Labs Reviewed - No data to display    All other labs were within normal range or not returned as of this dictation. EKG: All EKG's are interpreted by the Emergency Department Physician who either signs orCo-signs this chart in the absence of a cardiologist.  Please see their note for interpretation of EKG. RADIOLOGY:   Non-plain film images such as CT, Ultrasound and MRI are read by the radiologist. Plain radiographic images are visualized andpreliminarily interpreted by the  ED Provider with the below findings:        Interpretation perthe Radiologist below, if available at the time of this note:    No orders to display     No results found. PROCEDURES   Unless otherwise noted below, none     Procedures  Stab incision and drainage was performed by myself. Patient was prepped and draped in normal sterile fashion. He had initial rinsing of Peridex, application of benzocaine HurriCaine gel to the affected periapical space. A used an 18-gauge needle and did a poke incision with production of scant purulence but mild improvement in pressure sensation. Patient tolerated this procedure well there were no acute complications, bleeding controlled.     CRITICAL CARE TIME   N/A    CONSULTS:  None      EMERGENCY DEPARTMENT COURSE and DIFFERENTIALDIAGNOSIS/MDM:   Vitals:    Vitals:    10/07/22 2002 10/07/22 2003 10/07/22 2244   BP: (!) 177/106     Pulse: (!) 101  89   Resp: 16  16   Temp: 97.7 °F (36.5 °C)     TempSrc: Oral     SpO2:  99%    Weight:  250 lb (113.4 kg)    Height:  5' 11\" (1.803 m)        Patient was given thefollowing medications:  Medications worsening pain, trouble swallowing or breathing, inability to open mouth, neck stiffness or fever) that necessitate immediate return. Pt is in agreement with the current plan and all questions were addressed. Is this patient to be included in the SEP-1 Core Measure due to severe sepsis or septic shock? No   Exclusion criteria - the patient is NOT to be included for SEP-1 Core Measure due to:  2+ SIRS criteria are not met    Discharge Time out:  CC Reviewed Yes   Test Results Yes     Vitals:    10/07/22 2244   BP:    Pulse: 89   Resp: 16   Temp:    SpO2:               FINAL IMPRESSION      1. Dental infection    2. Dental abscess          DISPOSITION/PLAN   DISPOSITION Decision To Discharge 10/07/2022 10:09:54 PM      PATIENT REFERREDTO:  Dentist    Schedule an appointment as soon as possible for a visit       [facility]  [address] [facility]  Go to   If symptoms worsen      DISCHARGE MEDICATIONS:  Discharge Medication List as of 10/7/2022 10:38 PM        START taking these medications    Details   amoxicillin-clavulanate (AUGMENTIN) 875-125 MG per tablet Take 1 tablet by mouth 2 times daily for 10 days, Disp-20 tablet, R-0Normal      chlorhexidine (PERIDEX) 0.12 % solution Take 15 mLs by mouth 2 times daily for 14 days, Disp-420 mL, R-0Normal      Magic Mouthwash (MIRACLE MOUTHWASH) Swish and spit 10 mLs 4 times daily as needed for Irritation Dispense as Magic Mouthwash. Please add Equal Parts: 60 mL Maalox, 60 mL Viscous Lidocaine, 60 mL Benadryl to 60mL of Carafate.   10 ml swish and spit or swallow as directed above., Disp-240 mL , R-3Normal             DISCONTINUED MEDICATIONS:  Discharge Medication List as of 10/7/2022 10:38 PM                 (Please note that portions ofthis note were completed with a voice recognition program.  Efforts were made to edit the dictations but occasionally words are mis-transcribed.)    BRIDGETT Mojica (electronically signed)        BRIDGETT Hand  10/10/22 0000

## 2022-11-04 ENCOUNTER — HOSPITAL ENCOUNTER (EMERGENCY)
Age: 27
Discharge: HOME | End: 2022-11-04
Attending: STUDENT IN AN ORGANIZED HEALTH CARE EDUCATION/TRAINING PROGRAM

## 2022-11-04 VITALS
BODY MASS INDEX: 35.79 KG/M2 | SYSTOLIC BLOOD PRESSURE: 145 MMHG | HEART RATE: 100 BPM | WEIGHT: 250 LBS | DIASTOLIC BLOOD PRESSURE: 76 MMHG | RESPIRATION RATE: 18 BRPM | HEIGHT: 70 IN | TEMPERATURE: 97.1 F | OXYGEN SATURATION: 98 %

## 2022-11-04 DIAGNOSIS — R03.0 ELEVATED BLOOD PRESSURE READING: ICD-10-CM

## 2022-11-04 DIAGNOSIS — M54.50 CHRONIC RIGHT-SIDED LOW BACK PAIN WITHOUT SCIATICA: Primary | ICD-10-CM

## 2022-11-04 DIAGNOSIS — G89.29 CHRONIC RIGHT-SIDED LOW BACK PAIN WITHOUT SCIATICA: Primary | ICD-10-CM

## 2022-11-04 PROCEDURE — 96372 THER/PROPH/DIAG INJ SC/IM: CPT

## 2022-11-04 PROCEDURE — 99284 EMERGENCY DEPT VISIT MOD MDM: CPT

## 2022-11-04 PROCEDURE — 6360000002 HC RX W HCPCS: Performed by: STUDENT IN AN ORGANIZED HEALTH CARE EDUCATION/TRAINING PROGRAM

## 2022-11-04 PROCEDURE — 6370000000 HC RX 637 (ALT 250 FOR IP): Performed by: STUDENT IN AN ORGANIZED HEALTH CARE EDUCATION/TRAINING PROGRAM

## 2022-11-04 RX ORDER — NAPROXEN 500 MG/1
500 TABLET ORAL 2 TIMES DAILY PRN
Qty: 20 TABLET | Refills: 0 | Status: SHIPPED | OUTPATIENT
Start: 2022-11-04 | End: 2022-11-18

## 2022-11-04 RX ORDER — CYCLOBENZAPRINE HCL 10 MG
10 TABLET ORAL 3 TIMES DAILY PRN
Qty: 20 TABLET | Refills: 0 | Status: SHIPPED | OUTPATIENT
Start: 2022-11-04 | End: 2022-11-11

## 2022-11-04 RX ORDER — KETOROLAC TROMETHAMINE 30 MG/ML
30 INJECTION, SOLUTION INTRAMUSCULAR; INTRAVENOUS ONCE
Status: COMPLETED | OUTPATIENT
Start: 2022-11-04 | End: 2022-11-04

## 2022-11-04 RX ORDER — KETOROLAC TROMETHAMINE 30 MG/ML
30 INJECTION, SOLUTION INTRAMUSCULAR; INTRAVENOUS ONCE
Status: DISCONTINUED | OUTPATIENT
Start: 2022-11-04 | End: 2022-11-04

## 2022-11-04 RX ORDER — PREDNISONE 20 MG/1
40 TABLET ORAL ONCE
Status: COMPLETED | OUTPATIENT
Start: 2022-11-04 | End: 2022-11-04

## 2022-11-04 RX ORDER — PREDNISONE 10 MG/1
TABLET ORAL
Qty: 30 TABLET | Refills: 0 | Status: SHIPPED | OUTPATIENT
Start: 2022-11-04 | End: 2022-11-16

## 2022-11-04 RX ORDER — ACETAMINOPHEN 500 MG
1000 TABLET ORAL ONCE
Status: COMPLETED | OUTPATIENT
Start: 2022-11-04 | End: 2022-11-04

## 2022-11-04 RX ORDER — LIDOCAINE 4 G/G
1 PATCH TOPICAL EVERY 24 HOURS
Qty: 30 PATCH | Refills: 0 | Status: SHIPPED | OUTPATIENT
Start: 2022-11-04 | End: 2022-12-04

## 2022-11-04 RX ADMIN — PREDNISONE 40 MG: 20 TABLET ORAL at 17:45

## 2022-11-04 RX ADMIN — KETOROLAC TROMETHAMINE 30 MG: 30 INJECTION, SOLUTION INTRAMUSCULAR; INTRAVENOUS at 17:45

## 2022-11-04 RX ADMIN — ACETAMINOPHEN 1000 MG: 500 TABLET ORAL at 17:45

## 2022-11-04 ASSESSMENT — PAIN - FUNCTIONAL ASSESSMENT: PAIN_FUNCTIONAL_ASSESSMENT: 0-10

## 2022-11-04 ASSESSMENT — PAIN DESCRIPTION - LOCATION: LOCATION: BACK

## 2022-11-04 NOTE — ED PROVIDER NOTES
[address] ED      CHIEF COMPLAINT  Back Pain       HISTORY OF PRESENT ILLNESS  Wagner Barnes is a 24 y.o. male with past medical history of GERD, gout who presents to the ED complaining of back pain. Wagner Barnes presents with chronic right sacroiliac area pain that does not radiate. The pain is  aching , started in March from no known injury. Onset was gradual, worse with nothing in particular, improves with nothing. Wagner Barnes also denies bladder incontinence, bladder urgency, bowel incontinence, bowel urgency, numbness, tingling, and weakness. Back Pain Red Flags:   IV drug abuse? No   Fever without source? No   Systemic illness? No   Immunosuppressed? No   Recent surgery/procedure? No   Incontinence or retention? No   Indwelling carbajal? No   Alcohol abuse? No      Diabetes? No   Night pain? No   3rd visit in 20 days? No   Renal failure? No   Total: 0     Old records reviewed: No pertinent information noted. No other complaints, modifying factors or associated symptoms. I have reviewed the following from the nursing documentation. Past Medical History:   Diagnosis Date    GERD (gastroesophageal reflux disease)     Gout     Headache     MIGRAINES     History reviewed. No pertinent surgical history. History reviewed. No pertinent family history.   Social History     Socioeconomic History    Marital status: Single     Spouse name: Not on file    Number of children: Not on file    Years of education: Not on file    Highest education level: Not on file   Occupational History    Not on file   Tobacco Use    Smoking status: Former     Packs/day: 0.50     Types: Cigars, Cigarettes    Smokeless tobacco: Never   Vaping Use    Vaping Use: Every day    Substances: Nicotine   Substance and Sexual Activity    Alcohol use: Not Currently     Comment: occ    Drug use: No    Sexual activity: Not on file   Other Topics Concern    Not on file   Social History Narrative    Not on file     Social Determinants of Health     Financial Resource Strain: Not on file   Food Insecurity: Not on file   Transportation Needs: Not on file   Physical Activity: Not on file   Stress: Not on file   Social Connections: Not on file   Intimate Partner Violence: Not on file   Housing Stability: Not on file     No current facility-administered medications for this encounter. Current Outpatient Medications   Medication Sig Dispense Refill    Magic Mouthwash (MIRACLE MOUTHWASH) Swish and spit 10 mLs 4 times daily as needed for Irritation Dispense as Magic Mouthwash. Please add Equal Parts: 60 mL Maalox, 60 mL Viscous Lidocaine, 60 mL Benadryl to 60mL of Carafate. 10 ml swish and spit or swallow as directed above. 240 mL 3    naproxen (NAPROSYN) 500 MG tablet Take 1 tablet by mouth 2 times daily (with meals) for 10 days 20 tablet 0    predniSONE (DELTASONE) 20 MG tablet Take 3 tabs orally daily for 3 days, then take 2 tabs orally for 3 days then take 1 tab orally for 3 days. 18 tablet 0    diclofenac (VOLTAREN) 50 MG EC tablet Take 1 tablet by mouth 2 times daily for 20 doses 20 tablet 0    allopurinol (ZYLOPRIM) 100 MG tablet Take 1 tablet by mouth daily 30 tablet 0    colchicine (COLCRYS) 0.6 MG tablet 1 tablet twice a day until symptoms resolve 30 tablet 0     No Known Allergies    REVIEW OF SYSTEMS  All systems reviewed, pertinent positives per HPI otherwise noted to be negative. PHYSICAL EXAM  BP (!) 185/92   Pulse 100   Temp 97.1 °F (36.2 °C) (Oral)   Resp 18   Ht 5' 10\" (1.778 m)   Wt 250 lb (113.4 kg)   SpO2 98%   BMI 35.87 kg/m²    GENERAL APPEARANCE: Awake and alert. Cooperative. no distress. HENT: Normocephalic. Atraumatic. Mucous membranes are moist  NECK: Supple. Full range of motion of the neck without stiffness or pain. EYES: PERRL. EOM's grossly intact. HEART/CHEST: RRR. No murmurs. LUNGS: Respirations unlabored. CTAB. Good air exchange.  Speaking comfortably in full sentences. ABDOMEN: No tenderness. Soft. Non-distended. No masses. No organomegaly. No guarding or rebound. no palpable pulsatile masses. BACK: no midline spinal tenderness. no CVA tenderness  MUSCULOSKELETAL: No extremity edema. Compartments soft. No deformity. No tenderness in the extremities. All extremities neurovascularly intact. SKIN: Warm and dry. No acute rashes. NEUROLOGICAL: Awake, alert and oriented x 3. Power intact at the hips, knees, ankles, and toes. Sensation is intact to light touch in the lower extremities. Patellar DTRs intact. Patient denies saddle anesthesia. No ataxia. PSYCHIATRIC: Normal mood and affect. LABS  I have reviewed all labs for this visit. No results found for this visit on 11/04/22. RADIOLOGY  No orders to display          ED COURSE / MDM  Patient seen and evaluated. Old records reviewed and pertinent information included in HPI. Labs and imaging reviewed and results discussed with patient. Overall well appearing patient, in no acute distress, presenting for acute on chronic back pain. Physical exam remarkable for normal no neurologic deficits. Differential diagnosis includes but is not limited to: Musculoskeletal pain, spinal stenosis, sciatica, disc herniation, degenerative joint disease    During the patient's ED course, the patient was given:  Medications   acetaminophen (TYLENOL) tablet 1,000 mg (1,000 mg Oral Given 11/4/22 1745)   predniSONE (DELTASONE) tablet 40 mg (40 mg Oral Given 11/4/22 1745)   ketorolac (TORADOL) injection 30 mg (30 mg IntraMUSCular Given 11/4/22 1745)      Patient denies dysuria or flank pain. Low suspicion for kidney stone, pyelonephritis or UTI . I completed a structured, evidence-based clinical evaluation to screen for acute non-traumatic spinal emergencies. The patient has a normal detailed neurologic exam and a low red flag score.  The evidence indicates that the patient is very low risk for an acute spinal emergency and this is consistent with my clinical intuition. The risk of further workup is higher than the likelihood of the patient having a spinal epidural abscess or other dangerous emergency spinal condition. It is, therefore, in the patients best interest not to do additional emergent testing at this time. I have discussed with the patient my clinical impression and the result of an evidence-based clinical evaluation to screen for spinal epidural abscess and other spinal emergencies, as well as the risk of further testing and hospitalization. The patient agrees with not pursuing further emergent evaluation for causes of back pain at this time. On reassessment, patient reports that pain has significantly improved. Work-up overall reassuring. At this time, feel the patient is appropriate for discharge to follow-up with a primary care doctor. Patient feels comfortable with discharge at this time. Patient was provided with prescriptions for prednisone, naproxen, lidocaine, and Flexeril. Return precautions given. Encouraged PCP follow-up in 3 days, follow-up with orthopedic spine as soon as possible. Patient discharged in stable condition. Is this patient to be included in the SEP-1 Core Measure due to severe sepsis or septic shock? No   Exclusion criteria - the patient is NOT to be included for SEP-1 Core Measure due to: Infection is not suspected    I estimate there is LOW risk for RAPIDLY EXPANDING OR RUPTURED AAA, AORTIC DISSECTION, CAUDA EQUINA SYNDROME, EPIDURAL MASS LESION, EPIDURAL ABSCESS, SPINAL STENOSIS, OR HERNIATED DISK CAUSING SEVERE STENOSIS thus I consider the discharge disposition reasonable. Wagner Conway (or their surrogate) and I have discussed the diagnosis and risks, and we agree with discharging home with close follow-up. We also discussed returning to the Emergency Department immediately if new or worsening symptoms occur.  We have discussed the symptoms which are most concerning that necessitate immediate return. CLINICAL IMPRESSION  1. Chronic right-sided low back pain without sciatica    2. Elevated blood pressure reading        Blood pressure (!) 145/76, pulse 100, temperature 97.1 °F (36.2 °C), temperature source Oral, resp. rate 18, height 5' 10\" (1.778 m), weight 250 lb (113.4 kg), SpO2 98 %. DISPOSITION  Hilary Hernandez was discharged to home in stable condition. Patient was given scripts for the following medications. I counseled patient how to take these medications. Discharge Medication List as of 11/4/2022  6:40 PM        START taking these medications    Details   cyclobenzaprine (FLEXERIL) 10 MG tablet Take 1 tablet by mouth 3 times daily as needed for Muscle spasms, Disp-20 tablet, R-0Normal      lidocaine 4 % external patch Place 1 patch onto the skin every 24 hours Place 1 patch onto the skin daily 12 hours on, 12 hours off., TransDERmal, EVERY 24 HOURS Starting Fri 11/4/2022, Until Sun 12/4/2022, For 30 days, Disp-30 patch, R-0, Normal      naproxen (NAPROSYN) 500 MG tablet Take 1 tablet by mouth 2 times daily as needed for Pain (with meals), Disp-20 tablet, R-0Normal      predniSONE (DELTASONE) 10 MG tablet Take 4 tablets by mouth daily for 3 days, THEN 3 tablets daily for 3 days, THEN 2 tablets daily for 3 days, THEN 1 tablet daily for 3 days. Take by oral route 4 tabs x 3 days, then 3 tabs x 3 days, then 2 tabs x 3 days, then 1 tab x 3 days, then disconti nue. Take with food. ., Disp-30 tablet, R-0Normal             Follow-up with:  Monet Christopher MD  [address] [facility]  [phone]    Schedule an appointment as soon as possible for a visit       [place] [facility] [place] [facility]  [address] [facility]  Go to   As needed, If symptoms worsen    [facility]  [phone]        DISCLAIMER: This chart was created using Dragon dictation software.   Efforts were made by me to ensure accuracy, however some errors may be present due to limitations of this technology and occasionally words are not transcribed correctly.         Nory Fuentes MD  11/05/22 5632

## 2022-11-04 NOTE — Clinical Note
Candido Goins was seen and treated in our emergency department on 11/4/2022. He may return to work on 11/07/2022. If you have any questions or concerns, please don't hesitate to call.       Miguelito Stearns MD

## 2022-11-18 ENCOUNTER — APPOINTMENT (OUTPATIENT)
Dept: GENERAL RADIOLOGY | Age: 27
End: 2022-11-18

## 2022-11-18 ENCOUNTER — HOSPITAL ENCOUNTER (EMERGENCY)
Age: 27
Discharge: HOME | End: 2022-11-18

## 2022-11-18 VITALS
SYSTOLIC BLOOD PRESSURE: 129 MMHG | WEIGHT: 250 LBS | BODY MASS INDEX: 35 KG/M2 | TEMPERATURE: 98.6 F | OXYGEN SATURATION: 98 % | DIASTOLIC BLOOD PRESSURE: 104 MMHG | HEART RATE: 98 BPM | HEIGHT: 71 IN | RESPIRATION RATE: 18 BRPM

## 2022-11-18 DIAGNOSIS — R03.0 ELEVATED BLOOD PRESSURE READING: ICD-10-CM

## 2022-11-18 DIAGNOSIS — G89.29 ACUTE EXACERBATION OF CHRONIC LOW BACK PAIN: Primary | ICD-10-CM

## 2022-11-18 DIAGNOSIS — M54.50 ACUTE EXACERBATION OF CHRONIC LOW BACK PAIN: Primary | ICD-10-CM

## 2022-11-18 PROCEDURE — 99283 EMERGENCY DEPT VISIT LOW MDM: CPT

## 2022-11-18 PROCEDURE — 72100 X-RAY EXAM L-S SPINE 2/3 VWS: CPT

## 2022-11-18 PROCEDURE — 6370000000 HC RX 637 (ALT 250 FOR IP): Performed by: REGISTERED NURSE

## 2022-11-18 RX ORDER — METHOCARBAMOL 750 MG/1
1500 TABLET, FILM COATED ORAL ONCE
Status: COMPLETED | OUTPATIENT
Start: 2022-11-18 | End: 2022-11-18

## 2022-11-18 RX ORDER — METHOCARBAMOL 500 MG/1
500 TABLET, FILM COATED ORAL 4 TIMES DAILY
Qty: 40 TABLET | Refills: 0 | Status: SHIPPED | OUTPATIENT
Start: 2022-11-18 | End: 2022-11-28

## 2022-11-18 RX ORDER — IBUPROFEN 800 MG/1
800 TABLET ORAL ONCE
Status: COMPLETED | OUTPATIENT
Start: 2022-11-18 | End: 2022-11-18

## 2022-11-18 RX ORDER — IBUPROFEN 800 MG/1
800 TABLET ORAL 2 TIMES DAILY PRN
Qty: 20 TABLET | Refills: 0 | Status: SHIPPED | OUTPATIENT
Start: 2022-11-18 | End: 2022-11-28

## 2022-11-18 RX ADMIN — IBUPROFEN 800 MG: 800 TABLET, FILM COATED ORAL at 21:51

## 2022-11-18 RX ADMIN — METHOCARBAMOL TABLETS 1500 MG: 750 TABLET, COATED ORAL at 21:51

## 2022-11-18 ASSESSMENT — PAIN SCALES - GENERAL
PAINLEVEL_OUTOF10: 7
PAINLEVEL_OUTOF10: 8

## 2022-11-18 ASSESSMENT — ENCOUNTER SYMPTOMS
SHORTNESS OF BREATH: 0
VOMITING: 0
NAUSEA: 0
COUGH: 0
DIARRHEA: 0
ABDOMINAL PAIN: 0
CHEST TIGHTNESS: 0
CONSTIPATION: 0
BACK PAIN: 1

## 2022-11-18 ASSESSMENT — PAIN DESCRIPTION - DESCRIPTORS
DESCRIPTORS: ACHING;SHOOTING
DESCRIPTORS: ACHING

## 2022-11-18 ASSESSMENT — PAIN - FUNCTIONAL ASSESSMENT: PAIN_FUNCTIONAL_ASSESSMENT: 0-10

## 2022-11-18 ASSESSMENT — PAIN DESCRIPTION - PAIN TYPE: TYPE: ACUTE PAIN

## 2022-11-18 ASSESSMENT — PAIN DESCRIPTION - LOCATION
LOCATION: BACK
LOCATION: BACK

## 2022-11-18 ASSESSMENT — PAIN DESCRIPTION - ORIENTATION: ORIENTATION: LOWER;RIGHT

## 2022-11-18 ASSESSMENT — PAIN DESCRIPTION - FREQUENCY: FREQUENCY: CONTINUOUS

## 2022-11-18 NOTE — Clinical Note
Joanne Rondon was seen and treated in our emergency department on 11/18/2022. He may return to work on 11/19/2022. No lifting until [place] has been evaluated and cleared by orthopedics      If you have any questions or concerns, please don't hesitate to call.       Vinay Mendieta, APRN - CNP

## 2022-11-19 NOTE — ED PROVIDER NOTES
weight. He does not have a history of IV drug use, diabetes or use of blood thinners. He is not an alcoholic. Quality is dull and aching with radiation to his right low back. Alleviating factors include rest and immobilization. Aggravating factors include movement and palpation. Pain is 8/10. Tylenol has been used for pain today. Chart review reveals pt has significant PMHx of GERD, gout, headaches. They take no medications. Nursing Notes were all reviewed and agreed with or any disagreements were addressed  in the HPI. Pt was seen during the [place] 19 pandemic. Appropriate PPE worn by ME during patient encounters. Pt seen during a time with constrained hospital bed capacity and other potential inpatient and outpatient resources were constrained due to the viral pandemic. REVIEW OF SYSTEMS    (2-9 systems for level 4, 10 or more for level 5)     Review of Systems   Constitutional:  Negative for chills, diaphoresis and fever. Respiratory:  Negative for cough, chest tightness and shortness of breath. Cardiovascular:  Negative for chest pain and leg swelling. Gastrointestinal:  Negative for abdominal pain, constipation, diarrhea, nausea and vomiting. Genitourinary:  Negative for difficulty urinating, dysuria, flank pain, frequency, hematuria, penile discharge, penile pain, penile swelling, scrotal swelling, testicular pain and urgency. Musculoskeletal:  Positive for back pain. Negative for arthralgias, gait problem, joint swelling, myalgias, neck pain and neck stiffness. Skin:  Negative for rash and wound. Neurological:  Negative for dizziness, weakness, light-headedness and headaches. Positives and Pertinent negatives as per HPI. Except as noted abovein the ROS, all other systems were reviewed and negative.        PAST MEDICAL HISTORY     Past Medical History:   Diagnosis Date    GERD (gastroesophageal reflux disease)     Gout     Headache     MIGRAINES         SURGICAL HISTORY History reviewed. No pertinent surgical history. CURRENTMEDICATIONS       Previous Medications    ALLOPURINOL (ZYLOPRIM) 100 MG TABLET    Take 1 tablet by mouth daily    COLCHICINE (COLCRYS) 0.6 MG TABLET    1 tablet twice a day until symptoms resolve    DICLOFENAC (VOLTAREN) 50 MG EC TABLET    Take 1 tablet by mouth 2 times daily for 20 doses    LIDOCAINE 4 % EXTERNAL PATCH    Place 1 patch onto the skin every 24 hours Place 1 patch onto the skin daily 12 hours on, 12 hours off. MAGIC MOUTHWASH (MIRACLE MOUTHWASH)    Swish and spit 10 mLs 4 times daily as needed for Irritation Dispense as Magic Mouthwash. Please add Equal Parts: 60 mL Maalox, 60 mL Viscous Lidocaine, 60 mL Benadryl to 60mL of Carafate. 10 ml swish and spit or swallow as directed above. ALLERGIES     Patient has no known allergies. FAMILYHISTORY     History reviewed. No pertinent family history. SOCIAL HISTORY       Social History     Socioeconomic History    Marital status: Single     Spouse name: None    Number of children: None    Years of education: None    Highest education level: None   Tobacco Use    Smoking status: Former     Packs/day: 0.50     Types: Cigars, Cigarettes    Smokeless tobacco: Never   Vaping Use    Vaping Use: Every day    Substances: Nicotine   Substance and Sexual Activity    Alcohol use: Not Currently     Comment: occ    Drug use: No       SCREENINGS    [place] Coma Scale  Eye Opening: Spontaneous  Best Verbal Response: Oriented  Best Motor Response: Obeys commands  [place] Coma Scale Score: 15        PHYSICAL EXAM    (up to 7 for level 4, 8 or more for level 5)     ED Triage Vitals [11/18/22 1953]   BP Temp Temp Source Heart Rate Resp SpO2 Height Weight   (!) 129/104 98.6 °F (37 °C) Oral 98 18 98 % 5' 11\" (1.803 m) 250 lb (113.4 kg)       Physical Exam  Vitals and nursing note reviewed. Constitutional:       Appearance: Normal appearance. He is obese. He is not ill-appearing or diaphoretic. HENT:      Head: Normocephalic and atraumatic. Right Ear: External ear normal.      Left Ear: External ear normal.   Eyes:      General:         Right eye: No discharge. Left eye: No discharge. Cardiovascular:      Rate and Rhythm: Normal rate and regular rhythm. Pulses: Normal pulses. Radial pulses are 2+ on the right side and 2+ on the left side. Dorsalis pedis pulses are 2+ on the right side and 2+ on the left side. Posterior tibial pulses are 2+ on the right side and 2+ on the left side. Heart sounds: Normal heart sounds. No murmur heard. No friction rub. No gallop. Pulmonary:      Effort: Pulmonary effort is normal. No respiratory distress. Breath sounds: Normal breath sounds. No stridor. No wheezing, rhonchi or rales. Abdominal:      General: Abdomen is flat. Bowel sounds are normal.      Palpations: Abdomen is soft. Tenderness: There is no abdominal tenderness. There is no right CVA tenderness or left CVA tenderness. Musculoskeletal:         General: Tenderness present. Normal range of motion. Cervical back: Normal, normal range of motion and neck supple. No rigidity or tenderness. Thoracic back: Normal.      Lumbar back: Tenderness present. No swelling, edema, deformity, signs of trauma, lacerations, spasms or bony tenderness. Normal range of motion. Negative right straight leg raise test and negative left straight leg raise test. No scoliosis. Back:       Right lower leg: No edema. Left lower leg: No edema. Comments:  He denies fevers or chills, bowel or bladder incontinence, inability to urinate, numbness or tingling to the perineal area, nausea or vomiting. His pain is worse with range of motion and movement better with rest.     Skin:     General: Skin is warm and dry. Capillary Refill: Capillary refill takes less than 2 seconds. Neurological:      General: No focal deficit present.       Mental Status: He is alert and oriented to person, place, and time. GCS: GCS eye subscore is 4. GCS verbal subscore is 5. GCS motor subscore is 6. Cranial Nerves: Cranial nerves 2-12 are intact. Sensory: Sensation is intact. Motor: Motor function is intact. Coordination: Coordination is intact. Gait: Gait is intact. Deep Tendon Reflexes:      Reflex Scores:       Patellar reflexes are 2+ on the right side and 2+ on the left side. Psychiatric:         Mood and Affect: Mood normal.         Behavior: Behavior normal.     PHYSICAL EXAM  BP (!) 129/104   Pulse 98   Temp 98.6 °F (37 °C) (Oral)   Resp 18   Ht 5' 11\" (1.803 m)   Wt 250 lb (113.4 kg)   SpO2 98%   BMI 34.87 kg/m²       DIAGNOSTIC RESULTS   LABS:    Labs Reviewed - No data to display    All other labs were within normal range or not returned as of this dictation. EKG: All EKG's are interpreted by the Emergency Department Physician who either signs orCo-signs this chart in the absence of a cardiologist.  Please see their note for interpretation of EKG. RADIOLOGY:   Non-plain film images such as CT, Ultrasound and MRI are read by the radiologist. [facility] radiographic images are visualized andpreliminarily interpreted by the  ED Provider with the below findings:        Interpretation perthe Radiologist below, if available at the time of this note:    XR LUMBAR SPINE (2-3 VIEWS)   Final Result   Mild disc space narrowing at the L4-L5 and L5-S1 levels which could represent   disc bulging or herniation. Slight lumbar levoscoliosis. No results found.        PROCEDURES   Unless otherwise noted below, none     Procedures    CRITICAL CARE TIME   N/A    CONSULTS:  None      EMERGENCY DEPARTMENT COURSE and DIFFERENTIALDIAGNOSIS/MDM:   Vitals:    Vitals:    11/18/22 1953   BP: (!) 129/104   Pulse: 98   Resp: 18   Temp: 98.6 °F (37 °C)   TempSrc: Oral   SpO2: 98%   Weight: 250 lb (113.4 kg)   Height: 5' 11\" (1.803 m) Patient was given thefollowing medications:  Medications   ibuprofen (ADVIL;MOTRIN) tablet 800 mg (800 mg Oral Given 11/18/22 2151)   methocarbamol (ROBAXIN) tablet 1,500 mg (1,500 mg Oral Given 11/18/22 2151)       PDMP Monitoring:    Last PDMP Iwona Cummings as Reviewed [facility]):  Review User Review Instant Review Result          Last Controlled Substance Monitoring Documentation      [id number] [facility] ED from 3/15/2017 in [facility] ED   Periodic Controlled Substance Monitoring Potential drug abuse or diversion identified, see note documentation. filed at 03/16/2017 0102          Urine Drug Screenings (1 yr)    No resulted procedures found. Medication Contract and Consent for Opioid Use Documents Filed        No documents found                    MDM:   This patient was seen and evaluated by myself. He presents to the emergency department today with complaints of right-sided low back pain. On exam he is alert and oriented, hemodynamically stable nontoxic in appearance. I utilized an evidence-based risk rating tool (CMT) along with my training and experience to weigh the risk of discharge against the risks of further testing, imaging, or hospitalization. At this time I estimate the risks of additional testing, imaging, or hospitalization to be equal to or greater than the risk of discharge. I discussed my risk assessment with the patient and the patient consents to the risk of discharge as well as the risk of uncertainty in estimating outcomes. Given the symptoms and findings present at this time, the chance of SEA or SCC is so remote that additional testing or imaging is more likely to harm the patient than diagnose SEA. His red flag score is 0. I did discuss with him further work-up in the emergency department including imaging as his had not been done prior to this visit. He was agreeable to this plan. He will be medicated with ibuprofen for pain and Robaxin.   He declines a lidocaine patch stating that it does not work very well for him and he prefers to use \"Tiger balm\". Images were evaluated and reviewed with the patient. X-ray lumbar spine interpreted by the radiologist for mild to space narrowing at the L4-L5 and L5-S1 which could represent disc bulging or herniation. There is a slight lumbar levoscoliosis. In reviewing the findings with the patient I did discuss with him the importance of following up with orthopedics as an outpatient. He states he is having difficulty with this because he is currently working on obtaining health insurance. He states that he will call Monday and attempt to schedule an appointment and discuss a payment plan. On reevaluation he did report an improvement of his pain after receiving medications in the emergency department. I discussed with him the plan for discharge including providing him with referral for orthopedic spine as well as PCP. He was counseled on his elevated blood pressure reading but in the emergency department denies any headaches or changes to his vision such as double vision or blurry vision and denies any chest pain or shortness of breath. She was provided with strict return precautions for the emergency department including but not limited to worsening pain, perineal paresthesia, fevers, changes in sensation such as numbness or tingling to his lower extremities, loss of bowel or bladder control or urinary retention or other concerns. He was provided with prescriptions for Robaxin as well as ibuprofen 800 mg. He declined naproxen stating that this \"upsets my stomach\". He verbalized understanding of all discharge teaching and was ultimately discharged in a stable condition with all questions answered. Is this patient to be included in the SEP-1 Core Measure due to severe sepsis or septic shock? No   Exclusion criteria - the patient is NOT to be included for SEP-1 Core Measure due to:   Infection is not suspected    Discharge Time out:  CC Reviewed Yes   Test Results Yes     Vitals:    11/18/22 1953   BP: (!) 129/104   Pulse: 98   Resp: 18   Temp: 98.6 °F (37 °C)   SpO2: 98%              FINAL IMPRESSION      1. Acute exacerbation of chronic low back pain    2.  Elevated blood pressure reading          DISPOSITION/PLAN   DISPOSITION Decision To Discharge 11/18/2022 10:30:07 PM      PATIENT REFERREDTO:  Tai Norton MD  [facility] [address] Pre-Services  [phone]        DISCHARGE MEDICATIONS:  New Prescriptions    IBUPROFEN (ADVIL;MOTRIN) 800 MG TABLET    Take 1 tablet by mouth 2 times daily as needed for Pain    METHOCARBAMOL (ROBAXIN) 500 MG TABLET    Take 1 tablet by mouth 4 times daily for 10 days       DISCONTINUED MEDICATIONS:  Discontinued Medications    NAPROXEN (NAPROSYN) 500 MG TABLET    Take 1 tablet by mouth 2 times daily as needed for Pain (with meals)              (Please note that portions ofthis note were completed with a voice recognition program.  Efforts were made to edit the dictations but occasionally words are mis-transcribed.)    BABS Quinn CNP (electronically signed)       BABS Quinn CNP  11/18/22 9084

## 2022-11-19 NOTE — DISCHARGE INSTRUCTIONS
As we discussed please call to establish care with a primary care physician. Please follow up for further evaluation of your back pain. If you develop worsening pain, fever, numbness/tingling, weakness, loss of bowel or bladder control or other concerns please return to the emergency department for evaluation. You can alternate Tylenol and Ibuprofen for pain as well as use robaxin for muscle relaxation.

## 2023-03-20 ENCOUNTER — HOSPITAL ENCOUNTER (EMERGENCY)
Age: 28
Discharge: HOME | End: 2023-03-20
Attending: STUDENT IN AN ORGANIZED HEALTH CARE EDUCATION/TRAINING PROGRAM
Payer: COMMERCIAL

## 2023-03-20 ENCOUNTER — APPOINTMENT (OUTPATIENT)
Dept: CT IMAGING | Age: 28
End: 2023-03-20
Payer: COMMERCIAL

## 2023-03-20 VITALS
OXYGEN SATURATION: 100 % | BODY MASS INDEX: 35 KG/M2 | DIASTOLIC BLOOD PRESSURE: 90 MMHG | WEIGHT: 250 LBS | TEMPERATURE: 98.4 F | SYSTOLIC BLOOD PRESSURE: 162 MMHG | RESPIRATION RATE: 22 BRPM | HEART RATE: 90 BPM | HEIGHT: 71 IN

## 2023-03-20 DIAGNOSIS — J03.90 ACUTE TONSILLITIS, UNSPECIFIED ETIOLOGY: Primary | ICD-10-CM

## 2023-03-20 LAB
ALBUMIN SERPL-MCNC: 4.2 G/DL (ref 3.4–5)
ALBUMIN/GLOB SERPL: 1.5 {RATIO} (ref 1.1–2.2)
ALP SERPL-CCNC: 58 U/L (ref 40–129)
ALT SERPL-CCNC: 123 U/L (ref 10–40)
ANION GAP SERPL CALCULATED.3IONS-SCNC: 10 MMOL/L (ref 3–16)
AST SERPL-CCNC: 56 U/L (ref 15–37)
BASOPHILS # BLD: 0 K/UL (ref 0–0.2)
BASOPHILS NFR BLD: 0.3 %
BILIRUB SERPL-MCNC: <0.2 MG/DL (ref 0–1)
BUN SERPL-MCNC: 6 MG/DL (ref 7–20)
CALCIUM SERPL-MCNC: 9.5 MG/DL (ref 8.3–10.6)
CHLORIDE SERPL-SCNC: 108 MMOL/L (ref 99–110)
CO2 SERPL-SCNC: 25 MMOL/L (ref 21–32)
CREAT SERPL-MCNC: 1 MG/DL (ref 0.9–1.3)
DEPRECATED RDW RBC AUTO: 13.3 % (ref 12.4–15.4)
EOSINOPHIL # BLD: 0.4 K/UL (ref 0–0.6)
EOSINOPHIL NFR BLD: 3.5 %
FLUAV RNA RESP QL NAA+PROBE: NOT DETECTED
FLUBV RNA RESP QL NAA+PROBE: NOT DETECTED
GFR SERPLBLD CREATININE-BSD FMLA CKD-EPI: >60 ML/MIN/{1.73_M2}
GLUCOSE SERPL-MCNC: 130 MG/DL (ref 70–99)
HCT VFR BLD AUTO: 40.4 % (ref 40.5–52.5)
HGB BLD-MCNC: 13.9 G/DL (ref 13.5–17.5)
LACTATE BLDV-SCNC: 1.6 MMOL/L (ref 0.4–1.9)
LYMPHOCYTES # BLD: 3.2 K/UL (ref 1–5.1)
LYMPHOCYTES NFR BLD: 29.1 %
MCH RBC QN AUTO: 30.4 PG (ref 26–34)
MCHC RBC AUTO-ENTMCNC: 34.4 G/DL (ref 31–36)
MCV RBC AUTO: 88.4 FL (ref 80–100)
MONOCYTES # BLD: 0.6 K/UL (ref 0–1.3)
MONOCYTES NFR BLD: 5.9 %
NEUTROPHILS # BLD: 6.8 K/UL (ref 1.7–7.7)
NEUTROPHILS NFR BLD: 61.2 %
PLATELET # BLD AUTO: 285 K/UL (ref 135–450)
PMV BLD AUTO: 8.1 FL (ref 5–10.5)
POTASSIUM SERPL-SCNC: 3.9 MMOL/L (ref 3.5–5.1)
PROT SERPL-MCNC: 7 G/DL (ref 6.4–8.2)
RBC # BLD AUTO: 4.57 M/UL (ref 4.2–5.9)
S PYO AG THROAT QL: NEGATIVE
SARS-COV-2 RNA RESP QL NAA+PROBE: NOT DETECTED
SODIUM SERPL-SCNC: 143 MMOL/L (ref 136–145)
WBC # BLD AUTO: 11 K/UL (ref 4–11)

## 2023-03-20 PROCEDURE — 87636 SARSCOV2 & INF A&B AMP PRB: CPT

## 2023-03-20 PROCEDURE — 83605 ASSAY OF LACTIC ACID: CPT

## 2023-03-20 PROCEDURE — 96375 TX/PRO/DX INJ NEW DRUG ADDON: CPT

## 2023-03-20 PROCEDURE — 87081 CULTURE SCREEN ONLY: CPT

## 2023-03-20 PROCEDURE — 36415 COLL VENOUS BLD VENIPUNCTURE: CPT

## 2023-03-20 PROCEDURE — 80053 COMPREHEN METABOLIC PANEL: CPT

## 2023-03-20 PROCEDURE — 6360000002 HC RX W HCPCS: Performed by: PHYSICIAN ASSISTANT

## 2023-03-20 PROCEDURE — 87077 CULTURE AEROBIC IDENTIFY: CPT

## 2023-03-20 PROCEDURE — 85025 COMPLETE CBC W/AUTO DIFF WBC: CPT

## 2023-03-20 PROCEDURE — 70491 CT SOFT TISSUE NECK W/DYE: CPT

## 2023-03-20 PROCEDURE — 6360000004 HC RX CONTRAST MEDICATION: Performed by: PHYSICIAN ASSISTANT

## 2023-03-20 PROCEDURE — 6370000000 HC RX 637 (ALT 250 FOR IP): Performed by: PHYSICIAN ASSISTANT

## 2023-03-20 PROCEDURE — 99285 EMERGENCY DEPT VISIT HI MDM: CPT

## 2023-03-20 PROCEDURE — 87880 STREP A ASSAY W/OPTIC: CPT

## 2023-03-20 PROCEDURE — 96374 THER/PROPH/DIAG INJ IV PUSH: CPT

## 2023-03-20 RX ORDER — PENICILLIN V POTASSIUM 500 MG/1
500 TABLET ORAL 4 TIMES DAILY
Qty: 28 TABLET | Refills: 0 | Status: SHIPPED | OUTPATIENT
Start: 2023-03-20 | End: 2023-03-27

## 2023-03-20 RX ORDER — DEXAMETHASONE SODIUM PHOSPHATE 10 MG/ML
10 INJECTION, SOLUTION INTRAMUSCULAR; INTRAVENOUS ONCE
Status: COMPLETED | OUTPATIENT
Start: 2023-03-20 | End: 2023-03-20

## 2023-03-20 RX ORDER — PREDNISONE 10 MG/1
60 TABLET ORAL DAILY
Qty: 30 TABLET | Refills: 0 | Status: SHIPPED | OUTPATIENT
Start: 2023-03-20 | End: 2023-03-25

## 2023-03-20 RX ORDER — PENICILLIN V POTASSIUM 250 MG/1
500 TABLET ORAL ONCE
Status: COMPLETED | OUTPATIENT
Start: 2023-03-20 | End: 2023-03-20

## 2023-03-20 RX ORDER — KETOROLAC TROMETHAMINE 30 MG/ML
15 INJECTION, SOLUTION INTRAMUSCULAR; INTRAVENOUS EVERY 6 HOURS PRN
Status: DISCONTINUED | OUTPATIENT
Start: 2023-03-20 | End: 2023-03-20 | Stop reason: HOSPADM

## 2023-03-20 RX ADMIN — PENICILLIN V POTASSIUM 500 MG: 250 TABLET, FILM COATED ORAL at 19:39

## 2023-03-20 RX ADMIN — IOPAMIDOL 75 ML: 755 INJECTION, SOLUTION INTRAVENOUS at 17:17

## 2023-03-20 RX ADMIN — DEXAMETHASONE SODIUM PHOSPHATE 10 MG: 10 INJECTION, SOLUTION INTRAMUSCULAR; INTRAVENOUS at 16:33

## 2023-03-20 RX ADMIN — KETOROLAC TROMETHAMINE 15 MG: 30 INJECTION, SOLUTION INTRAMUSCULAR at 16:33

## 2023-03-20 ASSESSMENT — ENCOUNTER SYMPTOMS
TROUBLE SWALLOWING: 0
GASTROINTESTINAL NEGATIVE: 1
SORE THROAT: 1
RESPIRATORY NEGATIVE: 1
VOICE CHANGE: 0

## 2023-03-20 ASSESSMENT — PAIN - FUNCTIONAL ASSESSMENT: PAIN_FUNCTIONAL_ASSESSMENT: NONE - DENIES PAIN

## 2023-03-20 NOTE — ED PROVIDER NOTES
I independently performed a history and physical on [address] [place] [address]. All diagnostic, treatment, and disposition decisions were made by myself in conjunction with the advanced practice provider/resident. Labs Reviewed   CBC WITH AUTO DIFFERENTIAL - Abnormal; Notable for the following components:       Result Value    Hematocrit 40.4 (*)     All other components within normal limits   COMPREHENSIVE METABOLIC PANEL W/ REFLEX TO MG FOR LOW K - Abnormal; Notable for the following components:    Glucose 130 (*)     BUN 6 (*)      (*)     AST 56 (*)     All other components within normal limits   COVID-19 & INFLUENZA COMBO   STREP SCREEN GROUP A THROAT   CULTURE, BETA STREP CONFIRM PLATES   LACTATE, SEPSIS     CT SOFT TISSUE NECK W CONTRAST   Final Result   Mild tonsillitis. No evidence of abscess. Mildly prominent level 2 cervical   lymph nodes, likely reactive. For further details of [address] [place] [address] emergency department encounter, please see the PENG/resident's documentation. I personally saw the patient and performed a substantive portion of the visit including all aspects of the medical decision making. Briefly, this is a 45-year-old male presenting with concerns for uvular swelling. Patient states that he was feeling fine and then took a nap this afternoon and woke up and noted that his uvula was swollen. He has no submandibular or sublingual swelling. He is afebrile and appears nontoxic. No difficulty speaking or tolerating secretions. Labs are performed and are reassuring. CT of the neck was performed which showed mild tonsillitis, no evidence of abscess and some likely reactive cervical lymph nodes. Patient will be treated with antibiotics for his tonsillitis, additionally given a steroid burst and follow-up with ENT. Given strict return precautions for the ED. He is comfortable in agreement with plan of care and was discharged.     I personally saw the patient and independently provided 0 minutes of non-concurrent critical care out of the total shared critical care time provided. I, Dr. Carol Donovan, am the primary clinician of record. 1. Acute tonsillitis, unspecified etiology          Comment: Please note this report has been produced using speech recognition software and may contain errors related to that system including errors in grammar, punctuation, and spelling, as well as words and phrases that may be inappropriate. If there are any questions or concerns please feel free to contact the dictating provider for clarification.        Claudio Reyes MD  03/20/23 [id number]

## 2023-03-20 NOTE — ED PROVIDER NOTES
pertinent surgical history. [address]       Discharge Medication List as of 3/20/2023  7:32 PM        CONTINUE these medications which have NOT CHANGED    Details   allopurinol (ZYLOPRIM) 100 MG tablet Take 1 tablet by mouth daily, Disp-30 tablet, R-0Normal             ALLERGIES     Patient has no known allergies. FAMILYHISTORY     History reviewed. No pertinent family history. SOCIAL HISTORY       Social History     Tobacco Use    Smoking status: Former     Packs/day: 0.50     Types: Cigars, Cigarettes    Smokeless tobacco: Never   Vaping Use    Vaping Use: Every day    Substances: Nicotine   Substance Use Topics    Alcohol use: Not Currently     Comment: occ    Drug use: No       SCREENINGS        [place] Coma Scale  Eye Opening: Spontaneous  Best Verbal Response: Oriented  Best Motor Response: Obeys commands  [place] Coma Scale Score: 15                CIWA Assessment  BP: (!) 162/90  Heart Rate: 90           PHYSICAL EXAM  1 or more Elements     ED Triage Vitals [03/20/23 1612]   BP Temp Temp src Heart Rate Resp SpO2 Height Weight   (!) 145/86 98.4 °F (36.9 °C) -- 95 16 97 % 5' 11\" (1.803 m) 250 lb (113.4 kg)       Physical Exam  Vitals and nursing note reviewed. Constitutional:       General: He is awake. He is not in acute distress. Appearance: Normal appearance. He is well-developed. He is obese. He is not ill-appearing, toxic-appearing or diaphoretic. HENT:      Head: Normocephalic and atraumatic. Right Ear: Tympanic membrane and external ear normal. No drainage, swelling or tenderness. No middle ear effusion. There is no impacted cerumen. No mastoid tenderness. No hemotympanum. Tympanic membrane is not injected, scarred, perforated, erythematous, retracted or bulging. Left Ear: Tympanic membrane and external ear normal. No drainage, swelling or tenderness. No middle ear effusion. There is no impacted cerumen. No mastoid tenderness. No hemotympanum.  Tympanic membrane is with ENT. Disposition Considerations (tests considered but not done, Admit vs D/C, Shared Decision Making, Pt Expectation of Test or Tx.):     Plan at this time will be to discharge home with close f/u to ENT. Patient given Pen VK BID for next 10 days. Told to continue with tylenol and motrin for pain control. Instructed to return immediately to the ED with new or worsening symptoms. Patient verbalized understanding and was discharged in stable condition. I am the Primary Clinician of Record. FINAL IMPRESSION      1.  Acute tonsillitis, unspecified etiology          DISPOSITION/PLAN     DISPOSITION Decision To Discharge 03/20/2023 07:03:29 PM      PATIENT REFERRED TO:  Tres Lyon DO  [address] [facility] [address] Sorenson [address] [place] [address]  [phone]    Schedule an appointment as soon as possible for a visit in 1 day      [facility] ED  [address] [facility] [address]  [phone]  Schedule an appointment as soon as possible for a visit   As needed, If symptoms worsen    [facility]  [phone]        DISCHARGE MEDICATIONS:  Discharge Medication List as of 3/20/2023  7:32 PM        START taking these medications    Details   penicillin v potassium (VEETID) 500 MG tablet Take 1 tablet by mouth 4 times daily for 7 days, Disp-28 tablet, R-0Normal      predniSONE (DELTASONE) 10 MG tablet Take 6 tablets by mouth daily for 5 doses, Disp-30 tablet, R-0Normal             DISCONTINUED MEDICATIONS:  Discharge Medication List as of 3/20/2023  7:32 PM        STOP taking these medications       ibuprofen (ADVIL;MOTRIN) 800 MG tablet Comments:   Reason for Stopping:         Magic Mouthwash (MIRACLE MOUTHWASH) Comments:   Reason for Stopping:         diclofenac (VOLTAREN) 50 MG EC tablet Comments:   Reason for Stopping:         colchicine (COLCRYS) 0.6 MG tablet Comments:   Reason for Stopping:                      (Please note that portions of this note were completed with a voice

## 2023-03-20 NOTE — Clinical Note
Padmini Carroll was seen and treated in our emergency department on 3/20/2023. He may return to work on 03/21/2023. If you have any questions or concerns, please don't hesitate to call.       Leela Chapman MD

## 2023-03-20 NOTE — DISCHARGE INSTRUCTIONS
Please continue with steroids and penicillin. Please follow-up with ENT. Return to the ER if you develop any new or worsening symptoms.

## 2023-03-22 LAB
ORGANISM: ABNORMAL
S PYO THROAT QL CULT: ABNORMAL
S PYO THROAT QL CULT: ABNORMAL

## 2023-06-17 ENCOUNTER — HOSPITAL ENCOUNTER (EMERGENCY)
Age: 28
Discharge: HOME | End: 2023-06-17
Payer: COMMERCIAL

## 2023-06-17 ENCOUNTER — APPOINTMENT (OUTPATIENT)
Dept: GENERAL RADIOLOGY | Age: 28
End: 2023-06-17
Payer: COMMERCIAL

## 2023-06-17 VITALS
DIASTOLIC BLOOD PRESSURE: 75 MMHG | HEART RATE: 82 BPM | OXYGEN SATURATION: 100 % | TEMPERATURE: 98.8 F | RESPIRATION RATE: 17 BRPM | HEIGHT: 70 IN | SYSTOLIC BLOOD PRESSURE: 126 MMHG | WEIGHT: 300 LBS | BODY MASS INDEX: 42.95 KG/M2

## 2023-06-17 DIAGNOSIS — M25.561 ACUTE PAIN OF RIGHT KNEE: Primary | ICD-10-CM

## 2023-06-17 DIAGNOSIS — R74.01 TRANSAMINITIS: ICD-10-CM

## 2023-06-17 DIAGNOSIS — R55 SYNCOPE AND COLLAPSE: ICD-10-CM

## 2023-06-17 LAB
ALBUMIN SERPL-MCNC: 4.2 G/DL (ref 3.4–5)
ALBUMIN/GLOB SERPL: 1.3 {RATIO} (ref 1.1–2.2)
ALP SERPL-CCNC: 45 U/L (ref 40–129)
ALT SERPL-CCNC: 122 U/L (ref 10–40)
ANION GAP SERPL CALCULATED.3IONS-SCNC: 10 MMOL/L (ref 3–16)
AST SERPL-CCNC: 70 U/L (ref 15–37)
BASOPHILS # BLD: 0 K/UL (ref 0–0.2)
BASOPHILS NFR BLD: 0.3 %
BILIRUB SERPL-MCNC: 0.5 MG/DL (ref 0–1)
BUN SERPL-MCNC: 10 MG/DL (ref 7–20)
CALCIUM SERPL-MCNC: 9.6 MG/DL (ref 8.3–10.6)
CHLORIDE SERPL-SCNC: 101 MMOL/L (ref 99–110)
CO2 SERPL-SCNC: 25 MMOL/L (ref 21–32)
CREAT SERPL-MCNC: 1.1 MG/DL (ref 0.9–1.3)
D DIMER: 0.44 UG/ML FEU (ref 0–0.6)
DEPRECATED RDW RBC AUTO: 13.4 % (ref 12.4–15.4)
EKG ATRIAL RATE: 103 BPM
EKG DIAGNOSIS: NORMAL
EKG P AXIS: 45 DEGREES
EKG P-R INTERVAL: 136 MS
EKG Q-T INTERVAL: 342 MS
EKG QRS DURATION: 84 MS
EKG QTC CALCULATION (BAZETT): 448 MS
EKG R AXIS: 39 DEGREES
EKG T AXIS: 15 DEGREES
EKG VENTRICULAR RATE: 103 BPM
EOSINOPHIL # BLD: 0.2 K/UL (ref 0–0.6)
EOSINOPHIL NFR BLD: 1.7 %
GFR SERPLBLD CREATININE-BSD FMLA CKD-EPI: >60 ML/MIN/{1.73_M2}
GLUCOSE SERPL-MCNC: 151 MG/DL (ref 70–99)
HCT VFR BLD AUTO: 40.8 % (ref 40.5–52.5)
HGB BLD-MCNC: 14.2 G/DL (ref 13.5–17.5)
LYMPHOCYTES # BLD: 2.2 K/UL (ref 1–5.1)
LYMPHOCYTES NFR BLD: 21.1 %
MCH RBC QN AUTO: 30.6 PG (ref 26–34)
MCHC RBC AUTO-ENTMCNC: 34.9 G/DL (ref 31–36)
MCV RBC AUTO: 87.6 FL (ref 80–100)
MONOCYTES # BLD: 0.4 K/UL (ref 0–1.3)
MONOCYTES NFR BLD: 4.2 %
NEUTROPHILS # BLD: 7.5 K/UL (ref 1.7–7.7)
NEUTROPHILS NFR BLD: 72.7 %
NT-PROBNP SERPL-MCNC: 46 PG/ML (ref 0–124)
PLATELET # BLD AUTO: 234 K/UL (ref 135–450)
PMV BLD AUTO: 8.2 FL (ref 5–10.5)
POTASSIUM SERPL-SCNC: 4.5 MMOL/L (ref 3.5–5.1)
PROT SERPL-MCNC: 7.5 G/DL (ref 6.4–8.2)
RBC # BLD AUTO: 4.66 M/UL (ref 4.2–5.9)
SODIUM SERPL-SCNC: 136 MMOL/L (ref 136–145)
TROPONIN, HIGH SENSITIVITY: 7 NG/L (ref 0–22)
TROPONIN, HIGH SENSITIVITY: 7 NG/L (ref 0–22)
WBC # BLD AUTO: 10.3 K/UL (ref 4–11)

## 2023-06-17 PROCEDURE — 96361 HYDRATE IV INFUSION ADD-ON: CPT

## 2023-06-17 PROCEDURE — 6360000002 HC RX W HCPCS: Performed by: REGISTERED NURSE

## 2023-06-17 PROCEDURE — 99285 EMERGENCY DEPT VISIT HI MDM: CPT

## 2023-06-17 PROCEDURE — 85025 COMPLETE CBC W/AUTO DIFF WBC: CPT

## 2023-06-17 PROCEDURE — 93010 ELECTROCARDIOGRAM REPORT: CPT | Performed by: INTERNAL MEDICINE

## 2023-06-17 PROCEDURE — 80053 COMPREHEN METABOLIC PANEL: CPT

## 2023-06-17 PROCEDURE — 71046 X-RAY EXAM CHEST 2 VIEWS: CPT

## 2023-06-17 PROCEDURE — 84484 ASSAY OF TROPONIN QUANT: CPT

## 2023-06-17 PROCEDURE — 85379 FIBRIN DEGRADATION QUANT: CPT

## 2023-06-17 PROCEDURE — 73562 X-RAY EXAM OF KNEE 3: CPT

## 2023-06-17 PROCEDURE — 2580000003 HC RX 258: Performed by: REGISTERED NURSE

## 2023-06-17 PROCEDURE — 83880 ASSAY OF NATRIURETIC PEPTIDE: CPT

## 2023-06-17 PROCEDURE — 96374 THER/PROPH/DIAG INJ IV PUSH: CPT

## 2023-06-17 PROCEDURE — 93005 ELECTROCARDIOGRAM TRACING: CPT | Performed by: REGISTERED NURSE

## 2023-06-17 PROCEDURE — 36415 COLL VENOUS BLD VENIPUNCTURE: CPT

## 2023-06-17 RX ORDER — 0.9 % SODIUM CHLORIDE 0.9 %
1000 INTRAVENOUS SOLUTION INTRAVENOUS ONCE
Status: COMPLETED | OUTPATIENT
Start: 2023-06-17 | End: 2023-06-17

## 2023-06-17 RX ORDER — KETOROLAC TROMETHAMINE 30 MG/ML
30 INJECTION, SOLUTION INTRAMUSCULAR; INTRAVENOUS ONCE
Status: COMPLETED | OUTPATIENT
Start: 2023-06-17 | End: 2023-06-17

## 2023-06-17 RX ADMIN — KETOROLAC TROMETHAMINE 30 MG: 30 INJECTION, SOLUTION INTRAMUSCULAR; INTRAVENOUS at 16:03

## 2023-06-17 RX ADMIN — SODIUM CHLORIDE 1000 ML: 9 INJECTION, SOLUTION INTRAVENOUS at 16:03

## 2023-06-17 ASSESSMENT — ENCOUNTER SYMPTOMS
VOMITING: 0
NAUSEA: 0
COUGH: 0
COLOR CHANGE: 0
CHEST TIGHTNESS: 0
ABDOMINAL PAIN: 0
CONSTIPATION: 0
SHORTNESS OF BREATH: 0
BACK PAIN: 0
SORE THROAT: 0
PHOTOPHOBIA: 0
RHINORRHEA: 0
EYE PAIN: 0
DIARRHEA: 0

## 2023-06-17 ASSESSMENT — PAIN SCALES - GENERAL
PAINLEVEL_OUTOF10: 10
PAINLEVEL_OUTOF10: 9

## 2023-06-17 ASSESSMENT — PAIN - FUNCTIONAL ASSESSMENT: PAIN_FUNCTIONAL_ASSESSMENT: 0-10

## 2023-06-17 ASSESSMENT — PAIN DESCRIPTION - PAIN TYPE: TYPE: ACUTE PAIN

## 2023-06-17 ASSESSMENT — PAIN DESCRIPTION - LOCATION: LOCATION: KNEE

## 2023-06-17 ASSESSMENT — PAIN DESCRIPTION - DESCRIPTORS: DESCRIPTORS: SPASM

## 2023-06-17 ASSESSMENT — PAIN DESCRIPTION - ORIENTATION: ORIENTATION: RIGHT

## 2023-06-17 NOTE — ED PROVIDER NOTES
small joint effusion on the x-ray of his knee. I did discuss with him treatment with this with an Ace wrap to prevent any swelling as he has no appreciable swelling at this time. He was given referral for orthopedics and instructed to follow-up with them. I did consider joint aspiration and discussed this with the patient as he has had it previously but the effusion is small he has no appreciable swelling and without an elevated white blood cell count I do not have clinical concern for septic joint at this time, he is continuing to demonstrate full range of motion of the joint and states his pain is somewhat improved from this morning and is able to ambulate without difficulty. I do suspect that he is low risk for syncope when applying the [facility] syncope rule. I also discussed this with the patient. He was instructed to increase his oral hydration over the next several days. With a negative D-dimer I do not feel that further evaluation for pulmonary embolism is warranted at this time. I did discuss with the patient and his family plan for disposition of discharge including following up with orthopedics as well as GI and both of them were in agreement and verbalized comfort with this plan. He was instructed on use of over-the-counter Tylenol and ibuprofen as needed for pain as well as applying ice to the knee for any associated he was instructed on use of over-the-counter Tylenol and ibuprofen as needed for pain as well as applying ice to the joint. Patient and his mother verbalized understanding of all discharge teaching and they were ultimately discharged in a stable condition with all questions answered. He was also h counseled on smoking cessation      Social Determinants Significantly Affecting Health : Patient has significant healthcare illiteracy    Is this patient to be included in the SEP-1 Core Measure due to severe sepsis or septic shock?    No  No confirmed or suspected

## 2023-06-17 NOTE — ED TRIAGE NOTES
Chief Complaint   Patient presents with    Loss of Consciousness     Pt states was at urgent care to have right knee looked at, states pain started yesterday, unaware of any injury, while at urgent care pt had loc and slumped over in chair, mother states took around 7 minutes for pt to become alert again

## 2023-12-07 ENCOUNTER — APPOINTMENT (OUTPATIENT)
Dept: GENERAL RADIOLOGY | Age: 28
End: 2023-12-07
Payer: COMMERCIAL

## 2023-12-07 ENCOUNTER — HOSPITAL ENCOUNTER (EMERGENCY)
Age: 28
Discharge: HOME | End: 2023-12-08
Attending: STUDENT IN AN ORGANIZED HEALTH CARE EDUCATION/TRAINING PROGRAM
Payer: COMMERCIAL

## 2023-12-07 ENCOUNTER — APPOINTMENT (OUTPATIENT)
Dept: CT IMAGING | Age: 28
End: 2023-12-07
Payer: COMMERCIAL

## 2023-12-07 DIAGNOSIS — K76.0 FATTY LIVER: Primary | ICD-10-CM

## 2023-12-07 DIAGNOSIS — R74.01 TRANSAMINITIS: ICD-10-CM

## 2023-12-07 DIAGNOSIS — J06.9 ACUTE UPPER RESPIRATORY INFECTION: ICD-10-CM

## 2023-12-07 DIAGNOSIS — M25.462 EFFUSION OF LEFT KNEE: ICD-10-CM

## 2023-12-07 LAB
ALBUMIN SERPL-MCNC: 4.4 G/DL (ref 3.4–5)
ALBUMIN/GLOB SERPL: 1.2 {RATIO} (ref 1.1–2.2)
ALP SERPL-CCNC: 67 U/L (ref 40–129)
ALT SERPL-CCNC: 199 U/L (ref 10–40)
ANION GAP SERPL CALCULATED.3IONS-SCNC: 12 MMOL/L (ref 3–16)
APPEARANCE FLUID: CLEAR
AST SERPL-CCNC: 145 U/L (ref 15–37)
BASE EXCESS BLDV CALC-SCNC: 1.8 MMOL/L (ref -3–3)
BASOPHILS # BLD: 0 K/UL (ref 0–0.2)
BASOPHILS NFR BLD: 0.4 %
BDY FLUID QUALITY: NORMAL
BILIRUB SERPL-MCNC: 0.6 MG/DL (ref 0–1)
BUN SERPL-MCNC: 12 MG/DL (ref 7–20)
CALCIUM SERPL-MCNC: 9.6 MG/DL (ref 8.3–10.6)
CELL COUNT FLUID TYPE: NORMAL
CHLORIDE SERPL-SCNC: 99 MMOL/L (ref 99–110)
CO2 BLDV-SCNC: 29 MMOL/L
CO2 SERPL-SCNC: 24 MMOL/L (ref 21–32)
COHGB MFR BLDV: 3.9 % (ref 0–1.5)
COLOR FLUID: YELLOW
CREAT SERPL-MCNC: 1.1 MG/DL (ref 0.9–1.3)
D DIMER: 0.68 UG/ML FEU (ref 0–0.6)
DEPRECATED RDW RBC AUTO: 13.5 % (ref 12.4–15.4)
EOSINOPHIL # BLD: 0.1 K/UL (ref 0–0.6)
EOSINOPHIL NFR BLD: 1.2 %
GFR SERPLBLD CREATININE-BSD FMLA CKD-EPI: >60 ML/MIN/{1.73_M2}
GLUCOSE SERPL-MCNC: 121 MG/DL (ref 70–99)
HCO3 BLDV-SCNC: 27.3 MMOL/L (ref 23–29)
HCT VFR BLD AUTO: 41.9 % (ref 40.5–52.5)
HGB BLD-MCNC: 14.8 G/DL (ref 13.5–17.5)
LACTATE BLDV-SCNC: 1.6 MMOL/L (ref 0.4–1.9)
LIPASE SERPL-CCNC: 52 U/L (ref 13–60)
LYMPHOCYTES # BLD: 1.6 K/UL (ref 1–5.1)
LYMPHOCYTES NFR BLD: 15 %
LYMPHOCYTES NFR FLD: 35 %
MCH RBC QN AUTO: 30.9 PG (ref 26–34)
MCHC RBC AUTO-ENTMCNC: 35.4 G/DL (ref 31–36)
MCV RBC AUTO: 87.2 FL (ref 80–100)
METHGB MFR BLDV: 0.3 %
MONOCYTES # BLD: 0.7 K/UL (ref 0–1.3)
MONOCYTES NFR BLD: 6.5 %
MONOCYTES NFR FLD: 24 %
NEUTROPHIL, FLUID: 41 %
NEUTROPHILS # BLD: 7.9 K/UL (ref 1.7–7.7)
NEUTROPHILS NFR BLD: 76.9 %
NRBC BLD MANUAL-RTO: 4 %
NT-PROBNP SERPL-MCNC: 51 PG/ML (ref 0–124)
NUC CELL # FLD: 1628 /CUMM
O2 THERAPY: ABNORMAL
PCO2 BLDV: 45.6 MMHG (ref 40–50)
PH BLDV: 7.39 [PH] (ref 7.35–7.45)
PLATELET # BLD AUTO: 270 K/UL (ref 135–450)
PMV BLD AUTO: 7.9 FL (ref 5–10.5)
PO2 BLDV: 24.9 MMHG (ref 25–40)
POTASSIUM SERPL-SCNC: 4.6 MMOL/L (ref 3.5–5.1)
PROT SERPL-MCNC: 8.1 G/DL (ref 6.4–8.2)
RBC # BLD AUTO: 4.8 M/UL (ref 4.2–5.9)
RBC FLUID: 300 /CUMM
SAO2 % BLDV: 44 %
SODIUM SERPL-SCNC: 135 MMOL/L (ref 136–145)
SPECIMEN SOURCE FLD: NORMAL
TOTAL CELLS COUNTED FLD: 100
TROPONIN, HIGH SENSITIVITY: 12 NG/L (ref 0–22)
TROPONIN, HIGH SENSITIVITY: 7 NG/L (ref 0–22)
WBC # BLD AUTO: 10.3 K/UL (ref 4–11)

## 2023-12-07 PROCEDURE — 85025 COMPLETE CBC W/AUTO DIFF WBC: CPT

## 2023-12-07 PROCEDURE — 89051 BODY FLUID CELL COUNT: CPT

## 2023-12-07 PROCEDURE — 83880 ASSAY OF NATRIURETIC PEPTIDE: CPT

## 2023-12-07 PROCEDURE — 71260 CT THORAX DX C+: CPT

## 2023-12-07 PROCEDURE — 96374 THER/PROPH/DIAG INJ IV PUSH: CPT

## 2023-12-07 PROCEDURE — 74177 CT ABD & PELVIS W/CONTRAST: CPT

## 2023-12-07 PROCEDURE — 93005 ELECTROCARDIOGRAM TRACING: CPT | Performed by: REGISTERED NURSE

## 2023-12-07 PROCEDURE — 2580000003 HC RX 258: Performed by: REGISTERED NURSE

## 2023-12-07 PROCEDURE — 89060 EXAM SYNOVIAL FLUID CRYSTALS: CPT

## 2023-12-07 PROCEDURE — 20610 DRAIN/INJ JOINT/BURSA W/O US: CPT

## 2023-12-07 PROCEDURE — 96375 TX/PRO/DX INJ NEW DRUG ADDON: CPT

## 2023-12-07 PROCEDURE — 99285 EMERGENCY DEPT VISIT HI MDM: CPT

## 2023-12-07 PROCEDURE — 36415 COLL VENOUS BLD VENIPUNCTURE: CPT

## 2023-12-07 PROCEDURE — 83690 ASSAY OF LIPASE: CPT

## 2023-12-07 PROCEDURE — 71046 X-RAY EXAM CHEST 2 VIEWS: CPT

## 2023-12-07 PROCEDURE — 82803 BLOOD GASES ANY COMBINATION: CPT

## 2023-12-07 PROCEDURE — 80053 COMPREHEN METABOLIC PANEL: CPT

## 2023-12-07 PROCEDURE — 6370000000 HC RX 637 (ALT 250 FOR IP): Performed by: STUDENT IN AN ORGANIZED HEALTH CARE EDUCATION/TRAINING PROGRAM

## 2023-12-07 PROCEDURE — 85379 FIBRIN DEGRADATION QUANT: CPT

## 2023-12-07 PROCEDURE — 87070 CULTURE OTHR SPECIMN AEROBIC: CPT

## 2023-12-07 PROCEDURE — 6360000004 HC RX CONTRAST MEDICATION: Performed by: REGISTERED NURSE

## 2023-12-07 PROCEDURE — 84484 ASSAY OF TROPONIN QUANT: CPT

## 2023-12-07 PROCEDURE — 73562 X-RAY EXAM OF KNEE 3: CPT

## 2023-12-07 PROCEDURE — 83605 ASSAY OF LACTIC ACID: CPT

## 2023-12-07 PROCEDURE — 87205 SMEAR GRAM STAIN: CPT

## 2023-12-07 PROCEDURE — 6360000002 HC RX W HCPCS: Performed by: REGISTERED NURSE

## 2023-12-07 RX ORDER — 0.9 % SODIUM CHLORIDE 0.9 %
1000 INTRAVENOUS SOLUTION INTRAVENOUS ONCE
Status: COMPLETED | OUTPATIENT
Start: 2023-12-07 | End: 2023-12-07

## 2023-12-07 RX ORDER — ACETAMINOPHEN 500 MG
1000 TABLET ORAL
Status: COMPLETED | OUTPATIENT
Start: 2023-12-07 | End: 2023-12-07

## 2023-12-07 RX ORDER — KETOROLAC TROMETHAMINE 15 MG/ML
30 INJECTION, SOLUTION INTRAMUSCULAR; INTRAVENOUS ONCE
Status: COMPLETED | OUTPATIENT
Start: 2023-12-07 | End: 2023-12-07

## 2023-12-07 RX ORDER — ONDANSETRON 2 MG/ML
4 INJECTION INTRAMUSCULAR; INTRAVENOUS ONCE
Status: COMPLETED | OUTPATIENT
Start: 2023-12-07 | End: 2023-12-07

## 2023-12-07 RX ADMIN — ONDANSETRON 4 MG: 2 INJECTION INTRAMUSCULAR; INTRAVENOUS at 17:07

## 2023-12-07 RX ADMIN — IOPAMIDOL 75 ML: 755 INJECTION, SOLUTION INTRAVENOUS at 17:40

## 2023-12-07 RX ADMIN — SODIUM CHLORIDE 1000 ML: 9 INJECTION, SOLUTION INTRAVENOUS at 17:08

## 2023-12-07 RX ADMIN — ACETAMINOPHEN 1000 MG: 500 TABLET ORAL at 23:35

## 2023-12-07 RX ADMIN — KETOROLAC TROMETHAMINE 15 MG: 15 INJECTION, SOLUTION INTRAMUSCULAR; INTRAVENOUS at 17:07

## 2023-12-07 ASSESSMENT — ENCOUNTER SYMPTOMS
ABDOMINAL PAIN: 1
SINUS PAIN: 0
SHORTNESS OF BREATH: 1
BLOOD IN STOOL: 0
CHEST TIGHTNESS: 0
CONSTIPATION: 0
WHEEZING: 0
DIARRHEA: 0
SORE THROAT: 0
SINUS PRESSURE: 0
NAUSEA: 1
RHINORRHEA: 1
VOMITING: 1
TROUBLE SWALLOWING: 0
STRIDOR: 0
COUGH: 1

## 2023-12-07 ASSESSMENT — PAIN - FUNCTIONAL ASSESSMENT: PAIN_FUNCTIONAL_ASSESSMENT: NONE - DENIES PAIN

## 2023-12-07 ASSESSMENT — PAIN SCALES - GENERAL: PAINLEVEL_OUTOF10: 1

## 2023-12-07 ASSESSMENT — PAIN DESCRIPTION - ORIENTATION: ORIENTATION: LEFT

## 2023-12-07 ASSESSMENT — PAIN DESCRIPTION - LOCATION: LOCATION: KNEE

## 2023-12-07 ASSESSMENT — PAIN DESCRIPTION - DESCRIPTORS: DESCRIPTORS: ACHING

## 2023-12-07 NOTE — ED PROVIDER NOTES
[address] [address] ED  EMERGENCY DEPARTMENT ENCOUNTER        Pt Name: Gurjit Murillo  MRN: [id number]  [address] [facility] 1995  Date of evaluation: 12/7/2023  Provider: BABS Zamarripa CNP  PCP: No primary care provider on file. This patient was seen and evaluated by the attending physician Braulio Shoemaker DO. I have evaluated this patient. My supervising physician was available for consultation. CHIEF COMPLAINT       Chief Complaint   Patient presents with    Abdominal Pain     States abd pain and nausea starting today. States having near syncopal episodes at home. Syncopal episode on 12/2 per family. Nausea       HISTORY OF PRESENT ILLNESS   (Location/Symptom, Timing/Onset, Context/Setting, Quality, Duration, Modifying Factors, Severity)  Note limiting factors. History from : Patient  Gurjit Murillo is a 34 y.o. male who presents via private car for  near syncope, knee pain, shortness of breath, congestion. Onset was two weeks ago. Duration has been since the onset. Context includes patient presents to the emergency department today with constellation of complaints. He states that he began with knee pain earlier in the month of December. He does have history of gout to his right knee and states that this time his left knee has been more painful with some generalized swelling. He states it feels like it is consistent with gout pain that he has had in the past.  He has had several near syncopal episodes at home over the last couple of weeks and he states that he thinks that these are \"because of pain\". Yesterday patient developed some cough with congestion. Cough is productive of mucus. Patient is a daily fever. He also endorses some nausea with vomiting and abdominal pain today. He denies any chest pain or palpitations. He denies any fevers or chills. He denies any urinary symptoms and denies any diarrhea or constipation.  Quality is dull and aching  with

## 2023-12-08 VITALS
RESPIRATION RATE: 16 BRPM | TEMPERATURE: 98.8 F | OXYGEN SATURATION: 98 % | WEIGHT: 313 LBS | HEIGHT: 70 IN | BODY MASS INDEX: 44.81 KG/M2 | HEART RATE: 92 BPM | DIASTOLIC BLOOD PRESSURE: 69 MMHG | SYSTOLIC BLOOD PRESSURE: 113 MMHG

## 2023-12-08 LAB
EKG ATRIAL RATE: 118 BPM
EKG DIAGNOSIS: NORMAL
EKG P AXIS: 39 DEGREES
EKG P-R INTERVAL: 134 MS
EKG Q-T INTERVAL: 300 MS
EKG QRS DURATION: 78 MS
EKG QTC CALCULATION (BAZETT): 420 MS
EKG R AXIS: 32 DEGREES
EKG T AXIS: 8 DEGREES
EKG VENTRICULAR RATE: 118 BPM

## 2023-12-08 PROCEDURE — 93010 ELECTROCARDIOGRAM REPORT: CPT | Performed by: INTERNAL MEDICINE

## 2023-12-08 NOTE — ED PROVIDER NOTES
I independently examined and evaluated Moises Sigala. I personally saw the patient and performed a substantive portion of the visit including all aspects of the medical decision making. In brief, this 77-year-old male is presenting with left-sided knee pain and swelling as well as abdominal pain, nausea, vomiting and generalized fatigue. Does have a history of gout and has had to have his right knee drained multiple times, but has never had swelling in this left knee before. Has not noticed any fevers or chills. Focused exam revealed   General: Alert, no acute distress, patient resting comfortably   Skin: warm, intact, no pallor noted   Head: Normocephalic, atraumatic   Eye: Normal conjunctiva   Cardiac: Normal peripheral perfusion  Respiratory: No acute distress   Musculoskeletal: Palpable left knee effusion with limited range of motion of the left knee  Neurological: alert and oriented, normal sensory and motor observed. Psychiatric: Cooperative    ED course: Lab work obtained and is overall reassuring. He did have an elevated D-dimer and a CTPA was obtained as well as CT abdomen pelvis which showed no acute process. Left knee x-ray does show the large effusion and chest x-ray shows no acute process. Fluid analysis is not suggestive of septic joint. Only 1600 nucleated cells and it was yellow in appearance, no cloudiness. At this time crystal analysis is still pending as is culture. Discussed results with the patient and his girlfriend. Unfortunately, his arthrocentesis was delayed due to multiple emergencies throughout the department as I was preparing to perform the procedure and so they have already been here for over 8 hours. They are not interested in waiting for the crystal analysis which I think is reasonable. Advised him on anti-inflammatories. Also informed him of the fatty liver changes on his CT scan. Given follow-up to PCP.   Given return precautions for severe worsening

## 2023-12-08 NOTE — DISCHARGE INSTRUCTIONS
Return to nearest ED if you develop severe worsening pain, fevers, nausea and vomiting, other concerning symptoms. Follow-up with your PCP in 2 to 3 days for the final results of your fluid analysis and cultures.

## 2023-12-10 LAB
BACTERIA FLD AEROBE CULT: NORMAL
GRAM STN SPEC: NORMAL

## 2023-12-11 LAB
BACTERIA FLD AEROBE CULT: NORMAL
GRAM STN SPEC: NORMAL

## 2023-12-25 LAB
BACTERIA FLD AEROBE CULT: NORMAL
GRAM STN SPEC: NORMAL

## 2024-05-07 ENCOUNTER — HOSPITAL ENCOUNTER (OUTPATIENT)
Dept: ULTRASOUND IMAGING | Age: 29
Discharge: HOME | End: 2024-05-07
Attending: STUDENT IN AN ORGANIZED HEALTH CARE EDUCATION/TRAINING PROGRAM
Payer: COMMERCIAL

## 2024-05-07 DIAGNOSIS — R10.11 RIGHT UPPER QUADRANT ABDOMINAL PAIN: ICD-10-CM

## 2024-05-07 DIAGNOSIS — R79.89 ELEVATED LFTS: ICD-10-CM

## 2024-05-07 PROCEDURE — 76705 ECHO EXAM OF ABDOMEN: CPT

## 2024-06-04 ENCOUNTER — OFFICE VISIT (OUTPATIENT)
Dept: URGENT CARE | Age: 29
End: 2024-06-04

## 2024-06-04 VITALS
HEIGHT: 70 IN | HEART RATE: 96 BPM | TEMPERATURE: 98.4 F | SYSTOLIC BLOOD PRESSURE: 129 MMHG | BODY MASS INDEX: 45.1 KG/M2 | OXYGEN SATURATION: 96 % | WEIGHT: 315 LBS | DIASTOLIC BLOOD PRESSURE: 89 MMHG

## 2024-06-04 DIAGNOSIS — J06.9 VIRAL URI: Primary | ICD-10-CM

## 2024-06-04 DIAGNOSIS — R09.81 NASAL CONGESTION: ICD-10-CM

## 2024-06-04 DIAGNOSIS — R03.0 ELEVATED BLOOD PRESSURE READING WITHOUT DIAGNOSIS OF HYPERTENSION: ICD-10-CM

## 2024-06-04 DIAGNOSIS — J02.9 SORE THROAT: ICD-10-CM

## 2024-06-04 DIAGNOSIS — R09.82 POSTNASAL DRIP: ICD-10-CM

## 2024-06-04 DIAGNOSIS — R05.1 ACUTE COUGH: ICD-10-CM

## 2024-06-04 RX ORDER — GUAIFENESIN 600 MG/1
600 TABLET, EXTENDED RELEASE ORAL 2 TIMES DAILY
Qty: 20 TABLET | Refills: 0 | Status: SHIPPED | OUTPATIENT
Start: 2024-06-04 | End: 2024-06-14

## 2024-06-04 RX ORDER — ALLOPURINOL 300 MG/1
300 TABLET ORAL DAILY
COMMUNITY
Start: 2024-05-01

## 2024-06-04 RX ORDER — BENZONATATE 200 MG/1
200 CAPSULE ORAL 3 TIMES DAILY PRN
Qty: 30 CAPSULE | Refills: 0 | Status: SHIPPED | OUTPATIENT
Start: 2024-06-04 | End: 2024-06-14

## 2024-06-04 RX ORDER — COLCHICINE 0.6 MG/1
0.6 TABLET ORAL DAILY
COMMUNITY
Start: 2024-05-30

## 2024-06-04 RX ORDER — METFORMIN HYDROCHLORIDE 500 MG/1
500 TABLET, EXTENDED RELEASE ORAL 2 TIMES DAILY WITH MEALS
COMMUNITY
Start: 2024-05-01

## 2024-06-04 RX ORDER — PSEUDOEPHEDRINE HCL 30 MG
30 TABLET ORAL EVERY 6 HOURS PRN
Qty: 48 TABLET | Refills: 1 | Status: SHIPPED | OUTPATIENT
Start: 2024-06-04

## 2024-06-04 RX ORDER — PANTOPRAZOLE SODIUM 40 MG/1
40 TABLET, DELAYED RELEASE ORAL DAILY
COMMUNITY
Start: 2024-04-29

## 2024-06-04 NOTE — PATIENT INSTRUCTIONS
Capmist prescribed for cough and congestion relief with expectorant therapy  Do not take other decongestants or cough medications while on this medication.  Benzonatate prescribed for cough relief.  Recommend OTC treatment for symptoms:  ibuprofen (Advil, Motrin) and acetaminophen (Tylenol) for fevers and pain relief.  decongestants (specifically pseudoephedrine - found behind the pharmacy counter - does not need a prescription) <avoid if you have a history of high blood pressure or heart conditions>, along with antihistamines (Claritin, Zyrtec, Allegra, or Xyzal) and nasal steroid sprays (such as Flonase) to help with nasal congestion and runny nose.  guaifenesin (Mucinex) can help with thinning out mucus which can help with chest congestion or with relieving persistent sinus pressure  throat sprays (Cepacol, chloraseptic) for throat pain.  dextromethorphan (Robitussin, Delsym), throat lozenges, and increased water intake for cough.  honey (1-2 teaspoons every hour) for relief of throat irritation and coughing fits.  warm teas, humidifiers, nasal lavages, and sleeping in an inclined position are also helpful options that can lessen symptoms.  Recommend warm compresses over the symptomatic ear(s) for 10-15 minutes, or a hot shower, followed by 1-2 minutes of massaging the area behind your ears and down the jaw-line to help with the ear congestion/ear pressure.    Follow up with your PCP within 5 days or, if unable, you can return to the clinic if symptoms persist beyond 5 days or if symptoms worsen.    If you develop shortness of breath, increased work of breathing, lightheadedness, or chest pain, contact 911, or follow up immediately with the nearest Emergency Department for further evaluation.      Continue at-home monitoring of BP - recommend keeping a log of your blood pressures to discuss with your PCP.   Follow up with PCP to further evaluate your elevated blood pressures noted in clinic - referral

## 2024-06-04 NOTE — PROGRESS NOTES
96    Temp: 98.4 °F (36.9 °C)    TempSrc: Oral    SpO2: 96%    Weight: (!) 142.9 kg (315 lb)    Height: 1.778 m (5' 10\")        Review of Systems  See HPI for pertinent positives and negatives.    Physical Exam  Vitals reviewed.   Constitutional:       Appearance: Normal appearance.   HENT:      Head: Normocephalic and atraumatic.      Right Ear: Ear canal and external ear normal. A middle ear effusion (small amount of clear fluid) is present. Tympanic membrane is retracted. Tympanic membrane is not injected or erythematous.      Left Ear: Ear canal and external ear normal. A middle ear effusion (small amount of clear fluid) is present. Tympanic membrane is retracted. Tympanic membrane is not injected or erythematous.      Nose: Congestion present. No rhinorrhea.      Mouth/Throat:      Mouth: Mucous membranes are moist.      Pharynx: Oropharynx is clear. Uvula midline. Posterior oropharyngeal erythema (with cobblestoning) present. No pharyngeal swelling, oropharyngeal exudate or uvula swelling.      Comments: Mucus drainage down the posterior oropharynx  Eyes:      Extraocular Movements: Extraocular movements intact.      Conjunctiva/sclera: Conjunctivae normal.      Pupils: Pupils are equal, round, and reactive to light.   Cardiovascular:      Rate and Rhythm: Normal rate and regular rhythm.      Heart sounds: Normal heart sounds.   Pulmonary:      Effort: Pulmonary effort is normal. No respiratory distress.      Breath sounds: Normal breath sounds. No stridor. No wheezing, rhonchi or rales.   Musculoskeletal:      Cervical back: Normal range of motion and neck supple.   Lymphadenopathy:      Cervical: No cervical adenopathy.   Skin:     General: Skin is warm and dry.   Neurological:      Mental Status: He is alert and oriented to person, place, and time.   Psychiatric:         Attention and Perception: Attention normal.         Behavior: Behavior is cooperative.       PROCEDURES:  Unless otherwise noted below,

## 2024-06-09 ENCOUNTER — HOSPITAL ENCOUNTER (EMERGENCY)
Age: 29
Discharge: HOME | End: 2024-06-10
Attending: STUDENT IN AN ORGANIZED HEALTH CARE EDUCATION/TRAINING PROGRAM
Payer: COMMERCIAL

## 2024-06-09 ENCOUNTER — APPOINTMENT (OUTPATIENT)
Dept: GENERAL RADIOLOGY | Age: 29
End: 2024-06-09
Payer: COMMERCIAL

## 2024-06-09 DIAGNOSIS — J20.9 ACUTE BRONCHITIS, UNSPECIFIED ORGANISM: Primary | ICD-10-CM

## 2024-06-09 LAB
ALBUMIN SERPL-MCNC: 3.9 G/DL (ref 3.4–5)
ALBUMIN/GLOB SERPL: 1 {RATIO} (ref 1.1–2.2)
ALP SERPL-CCNC: 64 U/L (ref 40–129)
ALT SERPL-CCNC: 92 U/L (ref 10–40)
ANION GAP SERPL CALCULATED.3IONS-SCNC: 11 MMOL/L (ref 3–16)
AST SERPL-CCNC: 81 U/L (ref 15–37)
BASOPHILS # BLD: 0.1 K/UL (ref 0–0.2)
BASOPHILS NFR BLD: 0.8 %
BILIRUB SERPL-MCNC: 0.4 MG/DL (ref 0–1)
BUN SERPL-MCNC: 8 MG/DL (ref 7–20)
CALCIUM SERPL-MCNC: 9.7 MG/DL (ref 8.3–10.6)
CHLORIDE SERPL-SCNC: 101 MMOL/L (ref 99–110)
CO2 SERPL-SCNC: 24 MMOL/L (ref 21–32)
CREAT SERPL-MCNC: 0.9 MG/DL (ref 0.9–1.3)
DEPRECATED RDW RBC AUTO: 13.9 % (ref 12.4–15.4)
EOSINOPHIL # BLD: 0.2 K/UL (ref 0–0.6)
EOSINOPHIL NFR BLD: 2.2 %
FLUAV RNA RESP QL NAA+PROBE: NOT DETECTED
FLUBV RNA RESP QL NAA+PROBE: NOT DETECTED
GFR SERPLBLD CREATININE-BSD FMLA CKD-EPI: >90 ML/MIN/{1.73_M2}
GLUCOSE SERPL-MCNC: 100 MG/DL (ref 70–99)
HCT VFR BLD AUTO: 40.7 % (ref 40.5–52.5)
HETEROPH AB BLD QL IA: NEGATIVE
HGB BLD-MCNC: 14 G/DL (ref 13.5–17.5)
LYMPHOCYTES # BLD: 3.6 K/UL (ref 1–5.1)
LYMPHOCYTES NFR BLD: 32 %
MCH RBC QN AUTO: 30.3 PG (ref 26–34)
MCHC RBC AUTO-ENTMCNC: 34.4 G/DL (ref 31–36)
MCV RBC AUTO: 88 FL (ref 80–100)
MONOCYTES # BLD: 0.7 K/UL (ref 0–1.3)
MONOCYTES NFR BLD: 6.4 %
NEUTROPHILS # BLD: 6.6 K/UL (ref 1.7–7.7)
NEUTROPHILS NFR BLD: 58.6 %
PLATELET # BLD AUTO: 281 K/UL (ref 135–450)
PMV BLD AUTO: 8.4 FL (ref 5–10.5)
POTASSIUM SERPL-SCNC: 5 MMOL/L (ref 3.5–5.1)
PROT SERPL-MCNC: 7.9 G/DL (ref 6.4–8.2)
RBC # BLD AUTO: 4.63 M/UL (ref 4.2–5.9)
S PYO AG THROAT QL: NEGATIVE
SARS-COV-2 RNA RESP QL NAA+PROBE: NOT DETECTED
SODIUM SERPL-SCNC: 136 MMOL/L (ref 136–145)
WBC # BLD AUTO: 11.2 K/UL (ref 4–11)

## 2024-06-09 PROCEDURE — 87880 STREP A ASSAY W/OPTIC: CPT

## 2024-06-09 PROCEDURE — 86308 HETEROPHILE ANTIBODY SCREEN: CPT

## 2024-06-09 PROCEDURE — 36415 COLL VENOUS BLD VENIPUNCTURE: CPT

## 2024-06-09 PROCEDURE — 87798 DETECT AGENT NOS DNA AMP: CPT

## 2024-06-09 PROCEDURE — 80053 COMPREHEN METABOLIC PANEL: CPT

## 2024-06-09 PROCEDURE — 99284 EMERGENCY DEPT VISIT MOD MDM: CPT

## 2024-06-09 PROCEDURE — 87081 CULTURE SCREEN ONLY: CPT

## 2024-06-09 PROCEDURE — 87636 SARSCOV2 & INF A&B AMP PRB: CPT

## 2024-06-09 PROCEDURE — 85025 COMPLETE CBC W/AUTO DIFF WBC: CPT

## 2024-06-09 PROCEDURE — 71045 X-RAY EXAM CHEST 1 VIEW: CPT

## 2024-06-09 ASSESSMENT — PAIN - FUNCTIONAL ASSESSMENT: PAIN_FUNCTIONAL_ASSESSMENT: 0-10

## 2024-06-09 ASSESSMENT — PAIN DESCRIPTION - DESCRIPTORS: DESCRIPTORS: SORE

## 2024-06-09 ASSESSMENT — LIFESTYLE VARIABLES
HOW OFTEN DO YOU HAVE A DRINK CONTAINING ALCOHOL: NEVER
HOW MANY STANDARD DRINKS CONTAINING ALCOHOL DO YOU HAVE ON A TYPICAL DAY: PATIENT DOES NOT DRINK

## 2024-06-09 ASSESSMENT — PAIN SCALES - GENERAL: PAINLEVEL_OUTOF10: 10

## 2024-06-09 ASSESSMENT — PAIN DESCRIPTION - LOCATION: LOCATION: THROAT

## 2024-06-10 VITALS
BODY MASS INDEX: 45.1 KG/M2 | DIASTOLIC BLOOD PRESSURE: 89 MMHG | HEART RATE: 89 BPM | OXYGEN SATURATION: 100 % | TEMPERATURE: 98.4 F | HEIGHT: 70 IN | RESPIRATION RATE: 16 BRPM | WEIGHT: 315 LBS | SYSTOLIC BLOOD PRESSURE: 124 MMHG

## 2024-06-10 PROCEDURE — 6370000000 HC RX 637 (ALT 250 FOR IP): Performed by: STUDENT IN AN ORGANIZED HEALTH CARE EDUCATION/TRAINING PROGRAM

## 2024-06-10 RX ORDER — DOXYCYCLINE HYCLATE 100 MG
100 TABLET ORAL ONCE
Status: COMPLETED | OUTPATIENT
Start: 2024-06-10 | End: 2024-06-10

## 2024-06-10 RX ORDER — DOXYCYCLINE HYCLATE 100 MG
100 TABLET ORAL 2 TIMES DAILY
Qty: 14 TABLET | Refills: 0 | Status: SHIPPED | OUTPATIENT
Start: 2024-06-10 | End: 2024-06-17

## 2024-06-10 RX ADMIN — DOXYCYCLINE HYCLATE 100 MG: 100 TABLET, COATED ORAL at 00:22

## 2024-06-10 ASSESSMENT — PAIN SCALES - GENERAL: PAINLEVEL_OUTOF10: 10

## 2024-06-10 NOTE — DISCHARGE INSTRUCTIONS
Follow-up with primary doctor soon as able for reevaluation.  Call them tomorrow to set up follow-up appointment.  Take antibiotics prescribed.  Return to emergency department for fevers, worsening sore throat, any difficulty swallowing, handling secretions, neck pain or stiffness, swelling of the neck, any chest pain, shortness of breath, Lancer dizziness, abdominal pain or any new changing or worsening symptoms were always here for evaluation never hesitate to return.

## 2024-06-10 NOTE — ED PROVIDER NOTES
(COLCRYS) 0.6 MG tablet Take 1 tablet by mouth daily      benzonatate (TESSALON) 200 MG capsule Take 1 capsule by mouth 3 times daily as needed for Cough 30 capsule 0    pseudoephedrine (DECONGESTANT) 30 MG tablet Take 1 tablet by mouth every 6 hours as needed for Congestion 48 tablet 1    guaiFENesin (MUCINEX) 600 MG extended release tablet Take 1 tablet by mouth 2 times daily for 10 days 20 tablet 0     Allergies   Allergen Reactions    Bee Venom Swelling       Nursing Notes Reviewed    Physical Exam:  Triage VS:    ED Triage Vitals   Enc Vitals Group      BP       Pulse       Resp       Temp       Temp src       SpO2       Weight       Height       Head Circumference       Peak Flow       Pain Score       Pain Loc       Pain Edu?       Excl. in GC?        My pulse ox interpretation is - normal    General appearance:  No acute distress.   Skin:  Warm. Dry.   Eye:  Extraocular movements intact.     Ears, nose, mouth and throat:  Oral mucosa moist   Neck:  Trachea midline.   Extremity:  No swelling.  Normal ROM     Heart:  Regular rate and rhythm, normal S1 & S2, no extra heart sounds.    Perfusion:  intact  Respiratory:  Lungs clear to auscultation bilaterally.  Respirations nonlabored.     Abdominal:  Normal bowel sounds.  Soft.  Nontender.  Non distended.  Back:  No CVA tenderness to palpation     Neurological:  Alert and oriented times 3.  No focal neuro deficits.             Psychiatric:  Appropriate    I have reviewed and interpreted all of the currently available lab results from this visit (if applicable):  No results found for this visit on 06/09/24.   Radiographs (if obtained):  Radiologist's Report Reviewed:  No results found.    EKG (if obtained): (All EKG's are interpreted by myself in the absence of a cardiologist)      MDM:  ***    Clinical Impression:  No diagnosis found.  Disposition referral (if applicable):  No follow-up provider specified.  Disposition medications (if applicable):  New

## 2024-06-11 LAB — S PYO THROAT QL CULT: NORMAL

## 2024-06-13 LAB
B PARAPERT DNA SPEC QL NAA+PROBE: NOT DETECTED
B PERT DNA SPEC QL NAA+PROBE: NOT DETECTED
SPECIMEN SOURCE: NORMAL

## 2025-05-13 ENCOUNTER — APPOINTMENT (OUTPATIENT)
Dept: GENERAL RADIOLOGY | Age: 30
End: 2025-05-13
Payer: COMMERCIAL

## 2025-05-13 ENCOUNTER — HOSPITAL ENCOUNTER (EMERGENCY)
Age: 30
Discharge: HOME | End: 2025-05-13
Payer: COMMERCIAL

## 2025-05-13 VITALS
DIASTOLIC BLOOD PRESSURE: 98 MMHG | HEART RATE: 98 BPM | BODY MASS INDEX: 39.76 KG/M2 | RESPIRATION RATE: 18 BRPM | TEMPERATURE: 98.1 F | HEIGHT: 71 IN | SYSTOLIC BLOOD PRESSURE: 144 MMHG | WEIGHT: 284 LBS | OXYGEN SATURATION: 100 %

## 2025-05-13 DIAGNOSIS — M79.675 PAIN OF LEFT GREAT TOE: ICD-10-CM

## 2025-05-13 DIAGNOSIS — M25.462 KNEE EFFUSION, LEFT: Primary | ICD-10-CM

## 2025-05-13 PROCEDURE — 73630 X-RAY EXAM OF FOOT: CPT

## 2025-05-13 PROCEDURE — 99284 EMERGENCY DEPT VISIT MOD MDM: CPT

## 2025-05-13 PROCEDURE — 6360000002 HC RX W HCPCS

## 2025-05-13 PROCEDURE — 96372 THER/PROPH/DIAG INJ SC/IM: CPT

## 2025-05-13 PROCEDURE — 73562 X-RAY EXAM OF KNEE 3: CPT

## 2025-05-13 RX ORDER — PREDNISONE 20 MG/1
20 TABLET ORAL 2 TIMES DAILY
Qty: 10 TABLET | Refills: 0 | Status: SHIPPED | OUTPATIENT
Start: 2025-05-13 | End: 2025-05-18

## 2025-05-13 RX ORDER — DULAGLUTIDE 4.5 MG/.5ML
4.5 INJECTION, SOLUTION SUBCUTANEOUS WEEKLY
COMMUNITY

## 2025-05-13 RX ORDER — KETOROLAC TROMETHAMINE 15 MG/ML
30 INJECTION, SOLUTION INTRAMUSCULAR; INTRAVENOUS ONCE
Status: COMPLETED | OUTPATIENT
Start: 2025-05-13 | End: 2025-05-13

## 2025-05-13 RX ADMIN — KETOROLAC TROMETHAMINE 30 MG: 15 INJECTION, SOLUTION INTRAMUSCULAR; INTRAVENOUS at 12:17

## 2025-05-13 ASSESSMENT — PAIN DESCRIPTION - PAIN TYPE: TYPE: ACUTE PAIN

## 2025-05-13 ASSESSMENT — PAIN SCALES - GENERAL: PAINLEVEL_OUTOF10: 10

## 2025-05-13 ASSESSMENT — PAIN DESCRIPTION - ORIENTATION: ORIENTATION: LEFT

## 2025-05-13 ASSESSMENT — PAIN DESCRIPTION - LOCATION: LOCATION: KNEE

## 2025-05-13 ASSESSMENT — LIFESTYLE VARIABLES
HOW MANY STANDARD DRINKS CONTAINING ALCOHOL DO YOU HAVE ON A TYPICAL DAY: PATIENT DOES NOT DRINK
HOW OFTEN DO YOU HAVE A DRINK CONTAINING ALCOHOL: NEVER

## 2025-05-13 ASSESSMENT — PAIN - FUNCTIONAL ASSESSMENT: PAIN_FUNCTIONAL_ASSESSMENT: 0-10

## 2025-05-13 NOTE — DISCHARGE INSTRUCTIONS
You can take Tylenol 1000 mg every 8 hours.  You were also given steroids to help with pain and inflammation.  Please rest, ice, and elevate the affected knee.  You are given a referral to orthopedics, please call and schedule a follow-up appointment.  Return to this department for any new or worsening symptoms including uncontrolled pain, redness, increased swelling, fevers.

## 2025-05-13 NOTE — ED PROVIDER NOTES
[facility] EMERGENCY DEPARTMENT  EMERGENCY DEPARTMENT ENCOUNTER        Pt Name: Wagner Hernandez  MRN: [id number]  Birthdate 1995  Date of evaluation: 5/13/2025  Provider: BRIDGETT Go  PCP: Susanne Pastor DO  Note Started: 12:01 PM EDT 5/13/25      PENG. I have evaluated this patient.        CHIEF COMPLAINT       Chief Complaint   Patient presents with    Knee Pain     Left knee pain and \"pocket\" that he thinks needs drained, similar issues in past to right knee and it was gout       HISTORY OF PRESENT ILLNESS: 1 or more Elements     History From: Patient    Limitations to history : None    Social Determinants Significantly Affecting Health : None    Chief Complaint: Left knee pain    Wagner Hernandez is a 29 y.o. male who presents to the emergency department for left knee pain.  States that he has a history of gout he is concerned that he is having a flareup.  He does take colchicine daily.  States that the pain has been ongoing for the last 3 weeks however over the last few days it has been increasing.  Also states that he is having pain in his left foot.  He does have a small effusion and thinks that he may need it drained.  He denies overlying erythema, warmth, fevers, chills, nausea, vomiting, diarrhea, numbness or weakness, chest pain, shortness of breath.    Nursing Notes were all reviewed and agreed with or any disagreements were addressed in the HPI.    REVIEW OF SYSTEMS :      Review of Systems    Positives and Pertinent negatives as per HPI.     SURGICAL HISTORY   History reviewed. No pertinent surgical history.    CURRENTMEDICATIONS       Discharge Medication List as of 5/13/2025  2:27 PM        CONTINUE these medications which have NOT CHANGED    Details   Dulaglutide (TRULICITY) 4.5 MG/0.5ML SOAJ Inject 4.5 mg into the skin once a week On Monday nightsHistorical Med      metFORMIN (GLUCOPHAGE-XR) 500 MG extended release tablet Take 1 tablet by mouth 2 times daily (with

## 2025-06-17 ENCOUNTER — HOSPITAL ENCOUNTER (EMERGENCY)
Age: 30
Discharge: HOME | End: 2025-06-17
Attending: EMERGENCY MEDICINE
Payer: COMMERCIAL

## 2025-06-17 ENCOUNTER — APPOINTMENT (OUTPATIENT)
Dept: CT IMAGING | Age: 30
End: 2025-06-17
Payer: COMMERCIAL

## 2025-06-17 VITALS
OXYGEN SATURATION: 99 % | WEIGHT: 281.8 LBS | SYSTOLIC BLOOD PRESSURE: 136 MMHG | RESPIRATION RATE: 16 BRPM | HEART RATE: 88 BPM | TEMPERATURE: 98 F | DIASTOLIC BLOOD PRESSURE: 88 MMHG | BODY MASS INDEX: 39.3 KG/M2

## 2025-06-17 DIAGNOSIS — N23 RENAL COLIC: Primary | ICD-10-CM

## 2025-06-17 DIAGNOSIS — M25.462 EFFUSION OF LEFT KNEE: ICD-10-CM

## 2025-06-17 LAB
ANION GAP SERPL CALCULATED.3IONS-SCNC: 12 MMOL/L (ref 3–16)
APPEARANCE FLUID: NORMAL
BASOPHILS # BLD: 0 K/UL (ref 0–0.2)
BASOPHILS NFR BLD: 0.4 %
BDY FLUID QUALITY: NORMAL
BILIRUB UR QL STRIP.AUTO: NEGATIVE
BUN SERPL-MCNC: 17 MG/DL (ref 7–20)
CALCIUM SERPL-MCNC: 9.5 MG/DL (ref 8.3–10.6)
CELL COUNT FLUID TYPE: NORMAL
CHLORIDE SERPL-SCNC: 107 MMOL/L (ref 99–110)
CLARITY UR: CLEAR
CO2 SERPL-SCNC: 21 MMOL/L (ref 21–32)
COLOR FLUID: YELLOW
COLOR UR: YELLOW
CREAT SERPL-MCNC: 1.3 MG/DL (ref 0.9–1.3)
CRYSTALS FLD MICRO: NORMAL
DEPRECATED RDW RBC AUTO: 13.1 % (ref 12.4–15.4)
EOSINOPHIL # BLD: 0.2 K/UL (ref 0–0.6)
EOSINOPHIL NFR BLD: 1.9 %
GFR SERPLBLD CREATININE-BSD FMLA CKD-EPI: 77 ML/MIN/{1.73_M2}
GLUCOSE SERPL-MCNC: 106 MG/DL (ref 70–99)
GLUCOSE UR STRIP.AUTO-MCNC: NEGATIVE MG/DL
HCT VFR BLD AUTO: 40.8 % (ref 40.5–52.5)
HGB BLD-MCNC: 14.1 G/DL (ref 13.5–17.5)
HGB UR QL STRIP.AUTO: NEGATIVE
KETONES UR STRIP.AUTO-MCNC: NEGATIVE MG/DL
LEUKOCYTE ESTERASE UR QL STRIP.AUTO: NEGATIVE
LYMPHOCYTES # BLD: 2.6 K/UL (ref 1–5.1)
LYMPHOCYTES NFR BLD: 21.5 %
LYMPHOCYTES NFR FLD: 2 %
MCH RBC QN AUTO: 30.6 PG (ref 26–34)
MCHC RBC AUTO-ENTMCNC: 34.6 G/DL (ref 31–36)
MCV RBC AUTO: 88.5 FL (ref 80–100)
MONOCYTES # BLD: 0.6 K/UL (ref 0–1.3)
MONOCYTES NFR BLD: 5.1 %
NEUTROPHIL, FLUID: 98 %
NEUTROPHILS # BLD: 8.6 K/UL (ref 1.7–7.7)
NEUTROPHILS NFR BLD: 71.1 %
NITRITE UR QL STRIP.AUTO: NEGATIVE
NUC CELL # FLD: 6550 /CUMM
PATH INTERP FLD-IMP: YES
PH UR STRIP.AUTO: 5.5 [PH] (ref 5–8)
PLATELET # BLD AUTO: 330 K/UL (ref 135–450)
PMV BLD AUTO: 7.9 FL (ref 5–10.5)
POTASSIUM SERPL-SCNC: 4.6 MMOL/L (ref 3.5–5.1)
PROT UR STRIP.AUTO-MCNC: NEGATIVE MG/DL
RBC # BLD AUTO: 4.61 M/UL (ref 4.2–5.9)
RBC FLUID: 400 /CUMM
SODIUM SERPL-SCNC: 140 MMOL/L (ref 136–145)
SP GR UR STRIP.AUTO: 1.01 (ref 1–1.03)
SPECIMEN SOURCE FLD: NORMAL
TOTAL CELLS COUNTED FLD: 100
UA COMPLETE W REFLEX CULTURE PNL UR: NORMAL
UA DIPSTICK W REFLEX MICRO PNL UR: NORMAL
URN SPEC COLLECT METH UR: NORMAL
UROBILINOGEN UR STRIP-ACNC: 0.2 E.U./DL
WBC # BLD AUTO: 12 K/UL (ref 4–11)

## 2025-06-17 PROCEDURE — 74176 CT ABD & PELVIS W/O CONTRAST: CPT

## 2025-06-17 PROCEDURE — 89060 EXAM SYNOVIAL FLUID CRYSTALS: CPT

## 2025-06-17 PROCEDURE — 6360000002 HC RX W HCPCS: Performed by: EMERGENCY MEDICINE

## 2025-06-17 PROCEDURE — 87205 SMEAR GRAM STAIN: CPT

## 2025-06-17 PROCEDURE — 99284 EMERGENCY DEPT VISIT MOD MDM: CPT

## 2025-06-17 PROCEDURE — 85025 COMPLETE CBC W/AUTO DIFF WBC: CPT

## 2025-06-17 PROCEDURE — 87070 CULTURE OTHR SPECIMN AEROBIC: CPT

## 2025-06-17 PROCEDURE — 2580000003 HC RX 258: Performed by: EMERGENCY MEDICINE

## 2025-06-17 PROCEDURE — 80048 BASIC METABOLIC PNL TOTAL CA: CPT

## 2025-06-17 PROCEDURE — 96374 THER/PROPH/DIAG INJ IV PUSH: CPT

## 2025-06-17 PROCEDURE — 89051 BODY FLUID CELL COUNT: CPT

## 2025-06-17 PROCEDURE — 81003 URINALYSIS AUTO W/O SCOPE: CPT

## 2025-06-17 PROCEDURE — 20610 DRAIN/INJ JOINT/BURSA W/O US: CPT

## 2025-06-17 RX ORDER — SODIUM CHLORIDE 9 MG/ML
INJECTION, SOLUTION INTRAVENOUS PRN
Status: DISCONTINUED | OUTPATIENT
Start: 2025-06-17 | End: 2025-06-17 | Stop reason: HOSPADM

## 2025-06-17 RX ORDER — KETOROLAC TROMETHAMINE 30 MG/ML
30 INJECTION, SOLUTION INTRAMUSCULAR; INTRAVENOUS ONCE
Status: COMPLETED | OUTPATIENT
Start: 2025-06-17 | End: 2025-06-17

## 2025-06-17 RX ORDER — 0.9 % SODIUM CHLORIDE 0.9 %
1000 INTRAVENOUS SOLUTION INTRAVENOUS ONCE
Status: COMPLETED | OUTPATIENT
Start: 2025-06-17 | End: 2025-06-17

## 2025-06-17 RX ORDER — TAMSULOSIN HYDROCHLORIDE 0.4 MG/1
0.4 CAPSULE ORAL DAILY
Qty: 10 CAPSULE | Refills: 0 | Status: SHIPPED | OUTPATIENT
Start: 2025-06-17 | End: 2025-06-27

## 2025-06-17 RX ORDER — ACETAMINOPHEN 500 MG
500 TABLET ORAL EVERY 6 HOURS PRN
Qty: 30 TABLET | Refills: 0 | Status: SHIPPED | OUTPATIENT
Start: 2025-06-17

## 2025-06-17 RX ORDER — SODIUM CHLORIDE 0.9 % (FLUSH) 0.9 %
5-40 SYRINGE (ML) INJECTION PRN
Status: DISCONTINUED | OUTPATIENT
Start: 2025-06-17 | End: 2025-06-17 | Stop reason: HOSPADM

## 2025-06-17 RX ORDER — SODIUM CHLORIDE 0.9 % (FLUSH) 0.9 %
5-40 SYRINGE (ML) INJECTION EVERY 12 HOURS SCHEDULED
Status: DISCONTINUED | OUTPATIENT
Start: 2025-06-17 | End: 2025-06-17 | Stop reason: HOSPADM

## 2025-06-17 RX ADMIN — SODIUM CHLORIDE 1000 ML: 0.9 INJECTION, SOLUTION INTRAVENOUS at 10:49

## 2025-06-17 RX ADMIN — KETOROLAC TROMETHAMINE 30 MG: 30 INJECTION, SOLUTION INTRAMUSCULAR at 10:48

## 2025-06-17 ASSESSMENT — ENCOUNTER SYMPTOMS
EYE REDNESS: 0
ABDOMINAL PAIN: 0
SORE THROAT: 0
RHINORRHEA: 0
SHORTNESS OF BREATH: 0

## 2025-06-17 ASSESSMENT — PAIN SCALES - GENERAL
PAINLEVEL_OUTOF10: 4
PAINLEVEL_OUTOF10: 10

## 2025-06-17 ASSESSMENT — PAIN - FUNCTIONAL ASSESSMENT
PAIN_FUNCTIONAL_ASSESSMENT: 0-10
PAIN_FUNCTIONAL_ASSESSMENT: 0-10

## 2025-06-17 NOTE — ED PROVIDER NOTES
[facility] EMERGENCY DEPARTMENT     EMERGENCY DEPARTMENT ENCOUNTER            Pt Name: Wagner Hernandez   MRN: [id number]   Birthdate 1995   Date of evaluation: 6/17/2025   Provider: Jeff Qureshi MD   PCP: Susanne Pastor DO   Note Started: 9:39 AM EDT 6/17/25          CHIEF COMPLAINT     Chief Complaint   Patient presents with    Flank Pain     Pt reports right sided flank pain since last night, pt reports one episode of emesis. Pt reports also having increased pain in left knee, pt reports this hospital drained his knee 2 years ago, believes he need to have his knee drained again.              HISTORY OF PRESENT ILLNESS:   History from : Patient   Limitations to history : None     Wagner Hernandez is a 29 y.o. male who presents to the emergency department complaining of right flank pain and left knee pain.  The patient states that he was in his normal state of health until early this morning when he developed sharp severe stabbing pain in his right flank and radiating to his right lower quadrant.  No nausea or vomiting.  No prior episodes of this.  No history of fall or trauma.  The patient also complains of a left knee effusion for the past month a month and a half.  He was seen at a different ED and advised to follow-up with orthopedic surgery which she has failed to do.  He denies any fevers or chills.  No redness.  No history of trauma.    Nursing Notes were all reviewed and agreed with, or any disagreements were addressed in the HPI.     REVIEW OF SYSTEMS :    Review of Systems   Constitutional:  Negative for fever.   HENT:  Negative for rhinorrhea and sore throat.    Eyes:  Negative for redness.   Respiratory:  Negative for shortness of breath.    Cardiovascular:  Negative for chest pain.   Gastrointestinal:  Negative for abdominal pain.   Genitourinary:  Positive for flank pain.   Musculoskeletal:         Left knee swelling   Neurological:  Negative for headaches.

## 2025-06-18 LAB — PATH CONSULT FLUID: NORMAL

## 2025-06-20 LAB
BACTERIA FLD AEROBE CULT: NORMAL
GRAM STN SPEC: NORMAL

## 2025-06-21 ENCOUNTER — RESULTS FOLLOW-UP (OUTPATIENT)
Age: 30
End: 2025-06-21

## 2025-06-23 LAB
BACTERIA FLD AEROBE CULT: NORMAL
GRAM STN SPEC: NORMAL

## 2025-06-30 LAB
BACTERIA FLD AEROBE CULT: NORMAL
GRAM STN SPEC: NORMAL

## 2025-07-03 ENCOUNTER — APPOINTMENT (OUTPATIENT)
Dept: GENERAL RADIOLOGY | Age: 30
DRG: 469 | End: 2025-07-03
Payer: COMMERCIAL

## 2025-07-03 ENCOUNTER — APPOINTMENT (OUTPATIENT)
Dept: CT IMAGING | Age: 30
DRG: 469 | End: 2025-07-03
Payer: COMMERCIAL

## 2025-07-03 ENCOUNTER — HOSPITAL ENCOUNTER (INPATIENT)
Age: 30
LOS: 1 days | Discharge: HOME | DRG: 469 | End: 2025-07-05
Attending: EMERGENCY MEDICINE | Admitting: STUDENT IN AN ORGANIZED HEALTH CARE EDUCATION/TRAINING PROGRAM
Payer: COMMERCIAL

## 2025-07-03 DIAGNOSIS — G45.9 TIA (TRANSIENT ISCHEMIC ATTACK): ICD-10-CM

## 2025-07-03 DIAGNOSIS — N17.9 AKI (ACUTE KIDNEY INJURY): Primary | ICD-10-CM

## 2025-07-03 DIAGNOSIS — E87.20 METABOLIC ACIDOSIS: ICD-10-CM

## 2025-07-03 LAB
ALBUMIN SERPL-MCNC: 4.7 G/DL (ref 3.4–5)
ALBUMIN/GLOB SERPL: 1.2 {RATIO} (ref 1.1–2.2)
ALP SERPL-CCNC: 69 U/L (ref 40–129)
ALT SERPL-CCNC: 41 U/L (ref 10–40)
ANION GAP SERPL CALCULATED.3IONS-SCNC: 24 MMOL/L (ref 3–16)
AST SERPL-CCNC: 38 U/L (ref 15–37)
BASE EXCESS BLDV CALC-SCNC: -1.9 MMOL/L (ref -3–3)
BASOPHILS # BLD: 0.2 K/UL (ref 0–0.2)
BASOPHILS NFR BLD: 1.3 %
BILIRUB SERPL-MCNC: 0.6 MG/DL (ref 0–1)
BUN SERPL-MCNC: 21 MG/DL (ref 7–20)
CALCIUM SERPL-MCNC: 10.8 MG/DL (ref 8.3–10.6)
CHLORIDE SERPL-SCNC: 92 MMOL/L (ref 99–110)
CHP ED QC CHECK: YES
CO2 BLDV-SCNC: 22 MMOL/L
CO2 SERPL-SCNC: 18 MMOL/L (ref 21–32)
COHGB MFR BLDV: 3.3 % (ref 0–1.5)
CREAT SERPL-MCNC: 2.7 MG/DL (ref 0.9–1.3)
DEPRECATED RDW RBC AUTO: 13.4 % (ref 12.4–15.4)
EOSINOPHIL # BLD: 0 K/UL (ref 0–0.6)
EOSINOPHIL NFR BLD: 0.2 %
GFR SERPLBLD CREATININE-BSD FMLA CKD-EPI: 32 ML/MIN/{1.73_M2}
GLUCOSE BLD-MCNC: 135 MG/DL
GLUCOSE SERPL-MCNC: 123 MG/DL (ref 70–99)
HCO3 BLDV-SCNC: 20.6 MMOL/L (ref 23–29)
HCT VFR BLD AUTO: 43.6 % (ref 40.5–52.5)
HGB BLD-MCNC: 14.9 G/DL (ref 13.5–17.5)
INR PPP: 1.05 (ref 0.86–1.14)
LYMPHOCYTES # BLD: 1.9 K/UL (ref 1–5.1)
LYMPHOCYTES NFR BLD: 14.4 %
MCH RBC QN AUTO: 29.6 PG (ref 26–34)
MCHC RBC AUTO-ENTMCNC: 34.2 G/DL (ref 31–36)
MCV RBC AUTO: 86.6 FL (ref 80–100)
METHGB MFR BLDV: 0.3 %
MONOCYTES # BLD: 0.5 K/UL (ref 0–1.3)
MONOCYTES NFR BLD: 3.9 %
NEUTROPHILS # BLD: 10.7 K/UL (ref 1.7–7.7)
NEUTROPHILS NFR BLD: 80.2 %
O2 CT VFR BLDV CALC: 20 VOL %
O2 THERAPY: ABNORMAL
PCO2 BLDV: 29.6 MMHG (ref 40–50)
PH BLDV: 7.46 [PH] (ref 7.35–7.45)
PLATELET # BLD AUTO: 441 K/UL (ref 135–450)
PMV BLD AUTO: 7.6 FL (ref 5–10.5)
PO2 BLDV: 64 MMHG (ref 25–40)
POTASSIUM SERPL-SCNC: 4.3 MMOL/L (ref 3.5–5.1)
PROT SERPL-MCNC: 8.5 G/DL (ref 6.4–8.2)
PROTHROMBIN TIME: 14 SEC (ref 12.1–14.9)
RBC # BLD AUTO: 5.03 M/UL (ref 4.2–5.9)
SAO2 % BLDV: 94 %
SODIUM SERPL-SCNC: 134 MMOL/L (ref 136–145)
TROPONIN, HIGH SENSITIVITY: 10 NG/L (ref 0–22)
WBC # BLD AUTO: 13.4 K/UL (ref 4–11)

## 2025-07-03 PROCEDURE — 85025 COMPLETE CBC W/AUTO DIFF WBC: CPT

## 2025-07-03 PROCEDURE — 82010 KETONE BODYS QUAN: CPT

## 2025-07-03 PROCEDURE — 93005 ELECTROCARDIOGRAM TRACING: CPT | Performed by: EMERGENCY MEDICINE

## 2025-07-03 PROCEDURE — 83036 HEMOGLOBIN GLYCOSYLATED A1C: CPT

## 2025-07-03 PROCEDURE — 70498 CT ANGIOGRAPHY NECK: CPT

## 2025-07-03 PROCEDURE — 85610 PROTHROMBIN TIME: CPT

## 2025-07-03 PROCEDURE — 82077 ASSAY SPEC XCP UR&BREATH IA: CPT

## 2025-07-03 PROCEDURE — 82550 ASSAY OF CK (CPK): CPT

## 2025-07-03 PROCEDURE — 80179 DRUG ASSAY SALICYLATE: CPT

## 2025-07-03 PROCEDURE — 70496 CT ANGIOGRAPHY HEAD: CPT

## 2025-07-03 PROCEDURE — 84484 ASSAY OF TROPONIN QUANT: CPT

## 2025-07-03 PROCEDURE — 80053 COMPREHEN METABOLIC PANEL: CPT

## 2025-07-03 PROCEDURE — 99285 EMERGENCY DEPT VISIT HI MDM: CPT

## 2025-07-03 PROCEDURE — 6360000004 HC RX CONTRAST MEDICATION: Performed by: EMERGENCY MEDICINE

## 2025-07-03 PROCEDURE — 83970 ASSAY OF PARATHORMONE: CPT

## 2025-07-03 PROCEDURE — 82803 BLOOD GASES ANY COMBINATION: CPT

## 2025-07-03 PROCEDURE — 36415 COLL VENOUS BLD VENIPUNCTURE: CPT

## 2025-07-03 PROCEDURE — 2580000003 HC RX 258: Performed by: EMERGENCY MEDICINE

## 2025-07-03 PROCEDURE — 70450 CT HEAD/BRAIN W/O DYE: CPT

## 2025-07-03 PROCEDURE — 71045 X-RAY EXAM CHEST 1 VIEW: CPT

## 2025-07-03 RX ORDER — IOPAMIDOL 755 MG/ML
85 INJECTION, SOLUTION INTRAVASCULAR
Status: COMPLETED | OUTPATIENT
Start: 2025-07-03 | End: 2025-07-03

## 2025-07-03 RX ORDER — SODIUM CHLORIDE, SODIUM LACTATE, POTASSIUM CHLORIDE, AND CALCIUM CHLORIDE .6; .31; .03; .02 G/100ML; G/100ML; G/100ML; G/100ML
1000 INJECTION, SOLUTION INTRAVENOUS ONCE
Status: COMPLETED | OUTPATIENT
Start: 2025-07-03 | End: 2025-07-04

## 2025-07-03 RX ADMIN — SODIUM CHLORIDE, SODIUM LACTATE, POTASSIUM CHLORIDE, AND CALCIUM CHLORIDE 1000 ML: .6; .31; .03; .02 INJECTION, SOLUTION INTRAVENOUS at 23:38

## 2025-07-03 RX ADMIN — IOPAMIDOL 85 ML: 755 INJECTION, SOLUTION INTRAVENOUS at 22:01

## 2025-07-04 ENCOUNTER — APPOINTMENT (OUTPATIENT)
Dept: CT IMAGING | Age: 30
DRG: 469 | End: 2025-07-04
Payer: COMMERCIAL

## 2025-07-04 PROBLEM — N17.9 AKI (ACUTE KIDNEY INJURY): Status: ACTIVE | Noted: 2025-07-04

## 2025-07-04 LAB
ALBUMIN SERPL-MCNC: 4.2 G/DL (ref 3.4–5)
ALBUMIN/GLOB SERPL: 1.3 {RATIO} (ref 1.1–2.2)
ALP SERPL-CCNC: 61 U/L (ref 40–129)
ALT SERPL-CCNC: 33 U/L (ref 10–40)
ANION GAP SERPL CALCULATED.3IONS-SCNC: 22 MMOL/L (ref 3–16)
AST SERPL-CCNC: 26 U/L (ref 15–37)
BASOPHILS # BLD: 0 K/UL (ref 0–0.2)
BASOPHILS NFR BLD: 0.4 %
BETA-HYDROXYBUTYRATE: 0.61 MMOL/L (ref 0–0.27)
BILIRUB SERPL-MCNC: 0.6 MG/DL (ref 0–1)
BILIRUB UR QL STRIP.AUTO: ABNORMAL
BUN SERPL-MCNC: 23 MG/DL (ref 7–20)
CALCIUM SERPL-MCNC: 10.1 MG/DL (ref 8.3–10.6)
CHLORIDE SERPL-SCNC: 94 MMOL/L (ref 99–110)
CHOLEST SERPL-MCNC: 191 MG/DL (ref 0–199)
CK SERPL-CCNC: 169 U/L (ref 39–308)
CLARITY UR: CLEAR
CO2 SERPL-SCNC: 19 MMOL/L (ref 21–32)
COLOR UR: YELLOW
CREAT SERPL-MCNC: 1.8 MG/DL (ref 0.9–1.3)
CRP SERPL-MCNC: 12.6 MG/L (ref 0–5.1)
DEPRECATED RDW RBC AUTO: 13.4 % (ref 12.4–15.4)
EKG ATRIAL RATE: 89 BPM
EKG DIAGNOSIS: NORMAL
EKG P AXIS: 23 DEGREES
EKG P-R INTERVAL: 130 MS
EKG Q-T INTERVAL: 382 MS
EKG QRS DURATION: 84 MS
EKG QTC CALCULATION (BAZETT): 464 MS
EKG R AXIS: 60 DEGREES
EKG T AXIS: -16 DEGREES
EKG VENTRICULAR RATE: 89 BPM
EOSINOPHIL # BLD: 0.1 K/UL (ref 0–0.6)
EOSINOPHIL NFR BLD: 1.2 %
ERYTHROCYTE [SEDIMENTATION RATE] IN BLOOD BY WESTERGREN METHOD: 25 MM/HR (ref 0–15)
ETHANOLAMINE SERPL-MCNC: NORMAL MG/DL (ref 0–0.08)
GFR SERPLBLD CREATININE-BSD FMLA CKD-EPI: 52 ML/MIN/{1.73_M2}
GLUCOSE BLD-MCNC: 102 MG/DL (ref 70–99)
GLUCOSE BLD-MCNC: 115 MG/DL (ref 70–99)
GLUCOSE BLD-MCNC: 117 MG/DL (ref 70–99)
GLUCOSE BLD-MCNC: 82 MG/DL (ref 70–99)
GLUCOSE BLD-MCNC: 88 MG/DL (ref 70–99)
GLUCOSE SERPL-MCNC: 103 MG/DL (ref 70–99)
GLUCOSE UR STRIP.AUTO-MCNC: NEGATIVE MG/DL
HCT VFR BLD AUTO: 41.2 % (ref 40.5–52.5)
HDLC SERPL-MCNC: 26 MG/DL (ref 40–60)
HGB BLD-MCNC: 14.1 G/DL (ref 13.5–17.5)
HGB UR QL STRIP.AUTO: NEGATIVE
KETONES UR STRIP.AUTO-MCNC: NEGATIVE MG/DL
LACTATE BLDV-SCNC: 1 MMOL/L (ref 0.4–1.9)
LDLC SERPL CALC-MCNC: 133 MG/DL
LEUKOCYTE ESTERASE UR QL STRIP.AUTO: NEGATIVE
LYMPHOCYTES # BLD: 3.5 K/UL (ref 1–5.1)
LYMPHOCYTES NFR BLD: 37.3 %
MCH RBC QN AUTO: 29.8 PG (ref 26–34)
MCHC RBC AUTO-ENTMCNC: 34.2 G/DL (ref 31–36)
MCV RBC AUTO: 87 FL (ref 80–100)
MONOCYTES # BLD: 0.6 K/UL (ref 0–1.3)
MONOCYTES NFR BLD: 6.9 %
NEUTROPHILS # BLD: 5.1 K/UL (ref 1.7–7.7)
NEUTROPHILS NFR BLD: 54.2 %
NITRITE UR QL STRIP.AUTO: NEGATIVE
PERFORMED ON: ABNORMAL
PERFORMED ON: NORMAL
PERFORMED ON: NORMAL
PH UR STRIP.AUTO: 6 [PH] (ref 5–8)
PLATELET # BLD AUTO: 374 K/UL (ref 135–450)
PMV BLD AUTO: 7.5 FL (ref 5–10.5)
POTASSIUM SERPL-SCNC: 3.6 MMOL/L (ref 3.5–5.1)
PROT SERPL-MCNC: 7.4 G/DL (ref 6.4–8.2)
PROT UR STRIP.AUTO-MCNC: NEGATIVE MG/DL
PTH-INTACT SERPL-MCNC: 42.3 PG/ML (ref 14–72)
RBC # BLD AUTO: 4.74 M/UL (ref 4.2–5.9)
SALICYLATES SERPL-MCNC: <0.5 MG/DL (ref 15–30)
SODIUM SERPL-SCNC: 135 MMOL/L (ref 136–145)
SP GR UR STRIP.AUTO: 1.01 (ref 1–1.03)
TRIGL SERPL-MCNC: 159 MG/DL (ref 0–150)
UA COMPLETE W REFLEX CULTURE PNL UR: ABNORMAL
UA DIPSTICK W REFLEX MICRO PNL UR: ABNORMAL
URATE SERPL-MCNC: 13.1 MG/DL (ref 3.5–7.2)
URN SPEC COLLECT METH UR: ABNORMAL
UROBILINOGEN UR STRIP-ACNC: 0.2 E.U./DL
VLDLC SERPL CALC-MCNC: 32 MG/DL
WBC # BLD AUTO: 9.4 K/UL (ref 4–11)

## 2025-07-04 PROCEDURE — 2580000003 HC RX 258: Performed by: STUDENT IN AN ORGANIZED HEALTH CARE EDUCATION/TRAINING PROGRAM

## 2025-07-04 PROCEDURE — 85025 COMPLETE CBC W/AUTO DIFF WBC: CPT

## 2025-07-04 PROCEDURE — G0378 HOSPITAL OBSERVATION PER HR: HCPCS

## 2025-07-04 PROCEDURE — 86146 BETA-2 GLYCOPROTEIN ANTIBODY: CPT

## 2025-07-04 PROCEDURE — 74176 CT ABD & PELVIS W/O CONTRAST: CPT

## 2025-07-04 PROCEDURE — 83605 ASSAY OF LACTIC ACID: CPT

## 2025-07-04 PROCEDURE — 86431 RHEUMATOID FACTOR QUANT: CPT

## 2025-07-04 PROCEDURE — 86147 CARDIOLIPIN ANTIBODY EA IG: CPT

## 2025-07-04 PROCEDURE — 80053 COMPREHEN METABOLIC PANEL: CPT

## 2025-07-04 PROCEDURE — 86225 DNA ANTIBODY NATIVE: CPT

## 2025-07-04 PROCEDURE — 2580000003 HC RX 258: Performed by: INTERNAL MEDICINE

## 2025-07-04 PROCEDURE — 6370000000 HC RX 637 (ALT 250 FOR IP): Performed by: STUDENT IN AN ORGANIZED HEALTH CARE EDUCATION/TRAINING PROGRAM

## 2025-07-04 PROCEDURE — 2500000003 HC RX 250 WO HCPCS: Performed by: STUDENT IN AN ORGANIZED HEALTH CARE EDUCATION/TRAINING PROGRAM

## 2025-07-04 PROCEDURE — 36415 COLL VENOUS BLD VENIPUNCTURE: CPT

## 2025-07-04 PROCEDURE — 81003 URINALYSIS AUTO W/O SCOPE: CPT

## 2025-07-04 PROCEDURE — 86038 ANTINUCLEAR ANTIBODIES: CPT

## 2025-07-04 PROCEDURE — 1200000000 HC SEMI PRIVATE

## 2025-07-04 PROCEDURE — 85730 THROMBOPLASTIN TIME PARTIAL: CPT

## 2025-07-04 PROCEDURE — 85652 RBC SED RATE AUTOMATED: CPT

## 2025-07-04 PROCEDURE — 85610 PROTHROMBIN TIME: CPT

## 2025-07-04 PROCEDURE — 80061 LIPID PANEL: CPT

## 2025-07-04 PROCEDURE — 99222 1ST HOSP IP/OBS MODERATE 55: CPT | Performed by: STUDENT IN AN ORGANIZED HEALTH CARE EDUCATION/TRAINING PROGRAM

## 2025-07-04 PROCEDURE — 86200 CCP ANTIBODY: CPT

## 2025-07-04 PROCEDURE — 93010 ELECTROCARDIOGRAM REPORT: CPT | Performed by: STUDENT IN AN ORGANIZED HEALTH CARE EDUCATION/TRAINING PROGRAM

## 2025-07-04 PROCEDURE — 85613 RUSSELL VIPER VENOM DILUTED: CPT

## 2025-07-04 PROCEDURE — 84550 ASSAY OF BLOOD/URIC ACID: CPT

## 2025-07-04 PROCEDURE — 86140 C-REACTIVE PROTEIN: CPT

## 2025-07-04 PROCEDURE — 96360 HYDRATION IV INFUSION INIT: CPT

## 2025-07-04 PROCEDURE — 96361 HYDRATE IV INFUSION ADD-ON: CPT

## 2025-07-04 PROCEDURE — 83516 IMMUNOASSAY NONANTIBODY: CPT

## 2025-07-04 RX ORDER — SODIUM CHLORIDE 9 MG/ML
INJECTION, SOLUTION INTRAVENOUS PRN
Status: DISCONTINUED | OUTPATIENT
Start: 2025-07-04 | End: 2025-07-05 | Stop reason: HOSPADM

## 2025-07-04 RX ORDER — ACETAMINOPHEN 650 MG/1
650 SUPPOSITORY RECTAL EVERY 6 HOURS PRN
Status: DISCONTINUED | OUTPATIENT
Start: 2025-07-04 | End: 2025-07-05 | Stop reason: HOSPADM

## 2025-07-04 RX ORDER — ATORVASTATIN CALCIUM 40 MG/1
80 TABLET, FILM COATED ORAL NIGHTLY
Status: DISCONTINUED | OUTPATIENT
Start: 2025-07-04 | End: 2025-07-05

## 2025-07-04 RX ORDER — MAGNESIUM SULFATE IN WATER 40 MG/ML
2000 INJECTION, SOLUTION INTRAVENOUS PRN
Status: DISCONTINUED | OUTPATIENT
Start: 2025-07-04 | End: 2025-07-05 | Stop reason: HOSPADM

## 2025-07-04 RX ORDER — ONDANSETRON 4 MG/1
4 TABLET, ORALLY DISINTEGRATING ORAL EVERY 8 HOURS PRN
Status: DISCONTINUED | OUTPATIENT
Start: 2025-07-04 | End: 2025-07-05 | Stop reason: HOSPADM

## 2025-07-04 RX ORDER — POTASSIUM CHLORIDE 7.45 MG/ML
10 INJECTION INTRAVENOUS PRN
Status: DISCONTINUED | OUTPATIENT
Start: 2025-07-04 | End: 2025-07-05 | Stop reason: HOSPADM

## 2025-07-04 RX ORDER — DEXTROSE MONOHYDRATE 100 MG/ML
INJECTION, SOLUTION INTRAVENOUS CONTINUOUS PRN
Status: DISCONTINUED | OUTPATIENT
Start: 2025-07-04 | End: 2025-07-05 | Stop reason: HOSPADM

## 2025-07-04 RX ORDER — ASPIRIN 81 MG/1
81 TABLET, CHEWABLE ORAL DAILY
Status: DISCONTINUED | OUTPATIENT
Start: 2025-07-04 | End: 2025-07-05

## 2025-07-04 RX ORDER — POTASSIUM CHLORIDE 1500 MG/1
40 TABLET, EXTENDED RELEASE ORAL PRN
Status: DISCONTINUED | OUTPATIENT
Start: 2025-07-04 | End: 2025-07-05 | Stop reason: HOSPADM

## 2025-07-04 RX ORDER — ACETAMINOPHEN 325 MG/1
650 TABLET ORAL EVERY 6 HOURS PRN
Status: DISCONTINUED | OUTPATIENT
Start: 2025-07-04 | End: 2025-07-05 | Stop reason: HOSPADM

## 2025-07-04 RX ORDER — POLYETHYLENE GLYCOL 3350 17 G/17G
17 POWDER, FOR SOLUTION ORAL DAILY PRN
Status: DISCONTINUED | OUTPATIENT
Start: 2025-07-04 | End: 2025-07-05 | Stop reason: HOSPADM

## 2025-07-04 RX ORDER — ENOXAPARIN SODIUM 100 MG/ML
30 INJECTION SUBCUTANEOUS 2 TIMES DAILY
Status: DISCONTINUED | OUTPATIENT
Start: 2025-07-04 | End: 2025-07-05 | Stop reason: HOSPADM

## 2025-07-04 RX ORDER — SODIUM CHLORIDE 9 MG/ML
INJECTION, SOLUTION INTRAVENOUS CONTINUOUS
Status: DISCONTINUED | OUTPATIENT
Start: 2025-07-04 | End: 2025-07-04

## 2025-07-04 RX ORDER — SODIUM CHLORIDE, SODIUM LACTATE, POTASSIUM CHLORIDE, CALCIUM CHLORIDE 600; 310; 30; 20 MG/100ML; MG/100ML; MG/100ML; MG/100ML
INJECTION, SOLUTION INTRAVENOUS CONTINUOUS
Status: DISCONTINUED | OUTPATIENT
Start: 2025-07-04 | End: 2025-07-04

## 2025-07-04 RX ORDER — INSULIN LISPRO 100 [IU]/ML
0-4 INJECTION, SOLUTION INTRAVENOUS; SUBCUTANEOUS
Status: DISCONTINUED | OUTPATIENT
Start: 2025-07-04 | End: 2025-07-05 | Stop reason: HOSPADM

## 2025-07-04 RX ORDER — TAMSULOSIN HYDROCHLORIDE 0.4 MG/1
0.4 CAPSULE ORAL DAILY
Status: DISCONTINUED | OUTPATIENT
Start: 2025-07-04 | End: 2025-07-05 | Stop reason: HOSPADM

## 2025-07-04 RX ORDER — SODIUM CHLORIDE 9 MG/ML
INJECTION, SOLUTION INTRAVENOUS PRN
Status: DISCONTINUED | OUTPATIENT
Start: 2025-07-04 | End: 2025-07-04 | Stop reason: SDUPTHER

## 2025-07-04 RX ORDER — SODIUM CHLORIDE 0.9 % (FLUSH) 0.9 %
5-40 SYRINGE (ML) INJECTION EVERY 12 HOURS SCHEDULED
Status: DISCONTINUED | OUTPATIENT
Start: 2025-07-04 | End: 2025-07-05 | Stop reason: HOSPADM

## 2025-07-04 RX ORDER — SODIUM CHLORIDE 0.9 % (FLUSH) 0.9 %
5-40 SYRINGE (ML) INJECTION PRN
Status: DISCONTINUED | OUTPATIENT
Start: 2025-07-04 | End: 2025-07-05 | Stop reason: HOSPADM

## 2025-07-04 RX ORDER — ONDANSETRON 2 MG/ML
4 INJECTION INTRAMUSCULAR; INTRAVENOUS EVERY 6 HOURS PRN
Status: DISCONTINUED | OUTPATIENT
Start: 2025-07-04 | End: 2025-07-05 | Stop reason: HOSPADM

## 2025-07-04 RX ORDER — ASPIRIN 300 MG/1
300 SUPPOSITORY RECTAL DAILY
Status: DISCONTINUED | OUTPATIENT
Start: 2025-07-04 | End: 2025-07-05

## 2025-07-04 RX ORDER — GLUCAGON 1 MG/ML
1 KIT INJECTION PRN
Status: DISCONTINUED | OUTPATIENT
Start: 2025-07-04 | End: 2025-07-05 | Stop reason: HOSPADM

## 2025-07-04 RX ORDER — SODIUM CHLORIDE, SODIUM LACTATE, POTASSIUM CHLORIDE, CALCIUM CHLORIDE 600; 310; 30; 20 MG/100ML; MG/100ML; MG/100ML; MG/100ML
INJECTION, SOLUTION INTRAVENOUS CONTINUOUS
Status: DISCONTINUED | OUTPATIENT
Start: 2025-07-04 | End: 2025-07-05

## 2025-07-04 RX ORDER — SODIUM CHLORIDE 0.9 % (FLUSH) 0.9 %
5-40 SYRINGE (ML) INJECTION EVERY 12 HOURS SCHEDULED
Status: DISCONTINUED | OUTPATIENT
Start: 2025-07-04 | End: 2025-07-04 | Stop reason: SDUPTHER

## 2025-07-04 RX ORDER — SODIUM CHLORIDE 0.9 % (FLUSH) 0.9 %
5-40 SYRINGE (ML) INJECTION PRN
Status: DISCONTINUED | OUTPATIENT
Start: 2025-07-04 | End: 2025-07-04 | Stop reason: SDUPTHER

## 2025-07-04 RX ADMIN — Medication 3 MG: at 22:40

## 2025-07-04 RX ADMIN — SODIUM CHLORIDE: 0.9 INJECTION, SOLUTION INTRAVENOUS at 10:20

## 2025-07-04 RX ADMIN — Medication 10 ML: at 22:41

## 2025-07-04 RX ADMIN — ASPIRIN 81 MG: 81 TABLET, CHEWABLE ORAL at 10:24

## 2025-07-04 RX ADMIN — SODIUM CHLORIDE, SODIUM LACTATE, POTASSIUM CHLORIDE, AND CALCIUM CHLORIDE: .6; .31; .03; .02 INJECTION, SOLUTION INTRAVENOUS at 02:56

## 2025-07-04 RX ADMIN — ATORVASTATIN CALCIUM 80 MG: 40 TABLET, FILM COATED ORAL at 22:40

## 2025-07-04 RX ADMIN — SODIUM CHLORIDE, SODIUM LACTATE, POTASSIUM CHLORIDE, AND CALCIUM CHLORIDE: .6; .31; .03; .02 INJECTION, SOLUTION INTRAVENOUS at 18:02

## 2025-07-04 RX ADMIN — TAMSULOSIN HYDROCHLORIDE 0.4 MG: 0.4 CAPSULE ORAL at 10:24

## 2025-07-04 ASSESSMENT — PAIN SCALES - GENERAL
PAINLEVEL_OUTOF10: 0

## 2025-07-04 NOTE — CONSULTS
Inpatient Neurology Consult  [facility] Neurology  Melecio Keyes MD    Wagner Hernandez  1995    Date of Service: 7/4/2025    Referring Physician: Esteban Arizmendi MD    Reason for the consult and CC: left sided weakness/numbness    HPI:   Wagner Hernandez is a 29 y.o. male who  has a past medical history of Diabetes mellitus (HCC), GERD (gastroesophageal reflux disease), Gout, Headache, and Kidney stones. Admitted for left sided weakness/numbness, DARIANA, hypercalcemia.     He has been to the emergency room numerous times in the past ~10 years for acute uveitis; recurrent syncope; mild transaminitis with fatty liver; groin strain; left cheek abscess; left foot strain; cervical lymphadenopathy with abscess; pain/swelling right foot nos; acute gout right foot; right wrist pain nos; acute gout right toe; acute pharyngitis; right sciatica; right knee effusion; right wrist pain nos; right sciatica; acute gout left foot; lumbar strain; dental infection/abscess; chronic back pain; acute exacerbation of chronic back pain; right sciatica; acute tonsillitis nos; acute right knee pain, syncope/collapse, transaminitis; left knee effusion, fatty liver, transaminitis, acute upper respiratory infection; acute bronchitis; left knee effusion, left toe pain nos; and most recently left knee effusion s/p arthrocentesis suggestive of inflammatory etiology, kidney stone revealed on CT A/P treated with Flomax and Tylenol     He recalls an episode of left hand spasm when at work a few days ago without loss of awareness. Then there was an episode on the day of presentation at home. He was laying on the couch at rest then developed left thigh muscle spasm followed by left hand numbness and painful abnormal posture of the left hand, left perioral numbness, and left leg weakness (without numbness). He was sweating and felt faint. He lost awareness but describes this as being \"in and out\" for uncertain amount of time. Rosi

## 2025-07-04 NOTE — FLOWSHEET NOTE
07/04/25 1015   Vital Signs   Temp 98.2 °F (36.8 °C)   Temp Source Oral   Pulse 73   Heart Rate Source Monitor   Respirations 20   /72   MAP (Calculated) 89   BP Location Right upper arm   BP Method Automatic   Patient Position Semi wlers   Pain Assessment   Pain Assessment None - Denies Pain   Pain Level 0   Opioid-Induced Sedation   POSS Score 1   Oxygen Therapy   SpO2 97 %   O2 Device None (Room air)     AM assessment completed. No complaints of pain or discomfort voiced. No signs or symptoms of distress noted. Patient tolerated AM medications well. Respirations easy and even. Bed in lowest position, bed alarm in place and functioning properly, bed rails x2 up,  Call light within reach.     Bedside Mobility Assessment Tool (BMAT):     Assessment Level 1- Sit and Shake    1. From a semi-reclined position, ask patient to sit up and rotate to a seated position at the side of the bed. Can use the bedrail.    2. Ask patient to reach out and grab your hand and shake making sure patient reaches across his/her midline.   Pass- Patient is able to come to a seated position, maintain core strength. Maintains seated balance while reaching across midline. Move on to Assessment Level 2.     Assessment Level 2- Stretch and Point   1. With patient in seated position at the side of the bed, have patient place both feet on the floor (or stool) with knees no higher than hips.    2. Ask patient to stretch one leg and straighten the knee, then bend the ankle/flex and point the toes. If appropriate, repeat with the other leg.   Pass- Patient is able to demonstrate appropriate quad strength on intended weight bearing limb(s). Move onto Assessment Level 3.     Assessment Level 3- Stand   1. Ask patient to elevate off the bed or chair (seated to standing) using an assistive device (cane, bedrail).    2. Patient should be able to raise buttocks off be and hold for a count of five. May repeat once.   Pass- Patient maintains

## 2025-07-04 NOTE — PROGRESS NOTES
Speech Language Pathology  Attempt Note/ DC    Name: Wagner Hernandez  : 1995  Medical Diagnosis: Metabolic acidosis [E87.20]  DARIANA (acute kidney injury) [N17.9]      SLP attempted to see pt for bedside swallow evaluation (BSE) and cognitive-linguistic evaluation . Order acknowledged and chart reviewed for completion of eval. Reviewed role of SLP and rationale for eval. Pt demonstrated understanding but politely declined evaluation. Pt reports no changes in speech/language/cognition or difficulty swallowing. Encouraged pt to reach out if any changes occur or concerns arise. SLP to DC order, please re consult if appropriate. RN aware. No charges.    Thank you,    Rachell Trejo M.A., CCC-SLP  Speech-Language Pathologist  [id number]

## 2025-07-04 NOTE — ED NOTES
2145 - Stroke Alert Called, Dr. Foley at bedside  2150 - Code stroke called by Dr. Foley  2151 - Ting King, EDT called CT, CT stated they are ready  2151 - Blaise Patel, EDT called  Stroke team, waiting for call back  2155 -  stroke team returned call, speaking with Dr. Foley at this time

## 2025-07-04 NOTE — ED NOTES
Wagner Hernandez is a 29 y.o. male admitted for  Active Problems:    * No active hospital problems. *  Resolved Problems:    * No resolved hospital problems. *  .   Patient Home via EMS transportation with   Chief Complaint   Patient presents with    Numbness     R hand and tongue numbness.  R hand also got contracted but is fine now.  Blacked out approx 10-15 min.   .  Patient is alert and Person, Place, Time, and Situation  Patient's baseline mobility: Baseline Mobility: Independent   Code Status: No Order   Cardiac Rhythm:       Is patient on baseline Oxygen: no how many Liters:   Abnormal Assessment Findings:     Isolation: None      NIH Score:    C-SSRS: Risk of Suicide: No Risk  Bedside swallow:        Active LDA's:   Peripheral IV 07/03/25 Left;Anterior Antecubital (Active)     Patient admitted with a carbajal: no If the carbajal is chronic was it exchanged:NA  Reason for carbajal:   Patient admitted with Central Line:  . PICC line placement confirmed: YES OR NO:[id number]}   Reason for Central line:   Was central line Inserted from an outside facility:        Family/Caregiver Present no Any Concerns: no   Restraints no  Sitter no         Vitals:      Vitals:    07/03/25 2210 07/03/25 2232 07/03/25 2332 07/04/25 0101   BP:  (!) 144/85 122/61 101/67   Pulse:  90 73 70   Resp:  20 19 18   Temp:       TempSrc:       SpO2:  97% 95% 94%   Weight: 120.2 kg (265 lb)      Height: 1.778 m (5' 10\")          Last documented pain score (0-10 scale)    Pain medication administered No.    Pertinent or High Risk Medications/Drips: No.    Pending Blood Product Administration: no    Abnormal labs:   Abnormal Labs Reviewed   CBC WITH AUTO DIFFERENTIAL - Abnormal; Notable for the following components:       Result Value    WBC 13.4 (*)     Neutrophils Absolute 10.7 (*)     All other components within normal limits   COMPREHENSIVE METABOLIC PANEL W/ REFLEX TO MG FOR LOW K - Abnormal; Notable for the following components:    Sodium 134

## 2025-07-04 NOTE — PLAN OF CARE
Problem: Chronic Conditions and Co-morbidities  Goal: Patient's chronic conditions and co-morbidity symptoms are monitored and maintained or improved  7/4/2025 1446 by Radha Holland RN  Outcome: Progressing  7/4/2025 1446 by Radha Holland RN  Outcome: Progressing  7/4/2025 0304 by Emile Sims RN  Outcome: Progressing  Flowsheets (Taken 7/4/2025 0247)  Care Plan - Patient's Chronic Conditions and Co-Morbidity Symptoms are Monitored and Maintained or Improved: Monitor and assess patient's chronic conditions and comorbid symptoms for stability, deterioration, or improvement     Problem: Discharge Planning  Goal: Discharge to home or other facility with appropriate resources  7/4/2025 1446 by Radha Holland RN  Outcome: Progressing  7/4/2025 1446 by Radha Holland RN  Outcome: Progressing  Flowsheets  Taken 7/4/2025 0247 by Emile Sims RN  Discharge to home or other facility with appropriate resources: Identify barriers to discharge with patient and caregiver  Taken 7/4/2025 0235 by Emile Sims RN  Discharge to home or other facility with appropriate resources: Identify barriers to discharge with patient and caregiver

## 2025-07-04 NOTE — H&P
[facility] History & Physical    V 5.1    Date of Admission: 7/3/2025    Date of Service:  Pt seen/examined on 7/4/2025 at     [x]Admitted to Inpatient with expected LOS greater than two midnights due to medical therapy.  []Placed in Observation status.    Assessment/Plan:        DARIANA: Creatinine significant elevated at 2.7 with GFR 32. Calcium also elevated at 10.8.  CT of the abdomen pelvis showed ongoing retained contrast in the kidney and the urinary collecting system and the contrast concerning for renal failure in the was from CTA head and neck for stroke rule out.  Concerning this could be diabetic nephropathy versus ATN versus AIN from NSAIDs.  -Urinalysis ordered  -Urine sodium and urine creatinine ordered  -Additional 1 L IV fluid ordered, received 1 L in ER ready  -Repeat BMP ordered  -Uric acid level ordered  -Nephrology consulted  - Stop home pseudoephedrine    TIA: Patient started to notice left-sided numbness and weakness. CTA head did not show acute findings, stroke alert was called and TNK was not recommended.  -Continue NIHSS score  -MRI brain ordered  -Neurology consulted  -PT/OT consulted    Diabetes mellitus: Sliding scale insulin ordered with A1c.    Gout: Continue colchicine, patient is denying an acute flare        Discussed management and the need for Hospitalization of the patient w/ the Emergency Department Provider: Issa Foley MD      Chief Admission Complaint:  Left sided numbness    Presenting Admission History:      29 y.o. male who presented to [facility] with no prior medical history presented to the hospital due to reports of left-sided numbness and weakness.  Patient has history of gout, GERD, diabetes mellitus. He states he was sitting in his couch when he felt left-sided weakness as well as perioral paresthesia. He states his symptoms resolved while he was in the ER.  He also reports feeling lightheaded during the episode.  Currently does not have any  intrarenal calculi. Electronically signed by Mohan Allison      PCP: Susanne Pastor DO    Past Medical History:        Diagnosis Date    Diabetes mellitus (HCC)     GERD (gastroesophageal reflux disease)     Gout     Headache     MIGRAINES       Past Surgical History:    History reviewed. No pertinent surgical history.    Medications Prior to Admission:   Prior to Admission medications    Medication Sig Start Date End Date Taking? Authorizing Provider   acetaminophen (TYLENOL) 500 MG tablet Take 1 tablet by mouth every 6 hours as needed for Pain 6/17/25   Jeff Qureshi MD   tamsulosin (FLOMAX) 0.4 MG capsule Take 1 capsule by mouth daily for 10 days 6/17/25 6/27/25  Jeff Qureshi MD   Dulaglutide (TRULICITY) 4.5 MG/0.5ML SOAJ Inject 4.5 mg into the skin once a week On Monday nights    Maryellen Iqbal MD   metFORMIN (GLUCOPHAGE-XR) 500 MG extended release tablet Take 1 tablet by mouth 2 times daily (with meals) 5/1/24   Maryellen Iqbal MD   allopurinol (ZYLOPRIM) 300 MG tablet Take 1 tablet by mouth daily  Patient not taking: Reported on 5/13/2025 5/1/24   Maryellen Iqbal MD   pantoprazole (PROTONIX) 40 MG tablet Take 1 tablet by mouth daily  Patient not taking: Reported on 5/13/2025 4/29/24   Maryellen Iqbal MD   pseudoephedrine (DECONGESTANT) 30 MG tablet Take 1 tablet by mouth every 6 hours as needed for Congestion  Patient not taking: Reported on 5/13/2025 6/4/24   Delta Gamez, PA-C       Labs: Personally reviewed and interpreted for clinical significance.   Recent Labs     07/03/25 2149   WBC 13.4*   HGB 14.9   HCT 43.6        Recent Labs     07/03/25  2149   *   K 4.3   CL 92*   CO2 18*   BUN 21*   CREATININE 2.7*   CALCIUM 10.8*     Recent Labs     07/03/25  2149   TROPHS 10     No results for input(s): \"LABA1C\" in the last 72 hours.  Recent Labs     07/03/25 2149   AST 38*   ALT 41*   BILITOT 0.6   ALKPHOS 69     Recent Labs

## 2025-07-04 NOTE — CONSULTS
[email or web address]  Nephrology Consult Note           Reason for Consult: DARIANA  Requesting Physician:  Dr. Arizmendi    Chief Complaint:    Chief Complaint   Patient presents with    Numbness     R hand and tongue numbness.  R hand also got contracted but is fine now.  Blacked out approx 10-15 min.     History of Present Illness on 7/4/25:    29 y.o. yo male with PMH of GERD, gout, diabetes, migraines, kidney stones who is admitted for DARIANA  Patient presented to the emergency room with complaints of numbness and tingling of the left side of his body which started out with cramping of his left hand followed by numbness of his tongue and then heaviness of his leg.  This symptom started around 5 PM.  The symptoms resolved by around 3 AM after coming to the hospital and getting IV fluids.  Patient reports that he works as a  and and has about 10-hour a day outside.  He takes ibuprofen.  Admits to drinking a lot of water and Gatorade  His creatinine on admission was 2.7 and sodium of 134 and calcium of 10.8 which has started to improve with IV fluid  Patient underwent CTA of his brain along with neck due to strokelike symptoms    Past Medical History:        Diagnosis Date    Diabetes mellitus (HCC)     GERD (gastroesophageal reflux disease)     Gout     Headache     MIGRAINES    Kidney stones        Past Surgical History:    History reviewed. No pertinent surgical history.    Home Medications:    No current facility-administered medications on file prior to encounter.     Current Outpatient Medications on File Prior to Encounter   Medication Sig Dispense Refill    acetaminophen (TYLENOL) 500 MG tablet Take 1 tablet by mouth every 6 hours as needed for Pain 30 tablet 0    Dulaglutide (TRULICITY) 4.5 MG/0.5ML SOAJ Inject 4.5 mg into the skin once a week On Monday nights      metFORMIN (GLUCOPHAGE-XR) 500 MG extended release tablet Take 1 tablet by mouth 2 times daily (with meals)      pseudoephedrine (DECONGESTANT)  30 MG tablet Take 1 tablet by mouth every 6 hours as needed for Congestion 48 tablet 1    tamsulosin (FLOMAX) 0.4 MG capsule Take 1 capsule by mouth daily for 10 days 10 capsule 0    allopurinol (ZYLOPRIM) 300 MG tablet Take 1 tablet by mouth daily (Patient not taking: Reported on 7/4/2025)      pantoprazole (PROTONIX) 40 MG tablet Take 1 tablet by mouth daily (Patient not taking: Reported on 7/4/2025)         Allergies:  Bee venom    Social History:    Social History     Socioeconomic History    Marital status: Single     Spouse name: Not on file    Number of children: Not on file    Years of education: Not on file    Highest education level: Not on file   Occupational History    Not on file   Tobacco Use    Smoking status: Former     Current packs/day: 0.50     Types: Cigars, Cigarettes    Smokeless tobacco: Never   Vaping Use    Vaping status: Every Day    Substances: Nicotine   Substance and Sexual Activity    Alcohol use: Not Currently     Comment: occ    Drug use: Yes     Types: Marijuana (Weed)    Sexual activity: Not on file   Other Topics Concern    Not on file   Social History Narrative    Not on file     Social Drivers of Health     Financial Resource Strain: Not on File (10/13/2022)    Received from [facility]    Financial Resource Strain     Financial Resource Strain: 0   Food Insecurity: No Food Insecurity (7/4/2025)    Hunger Vital Sign     Worried About Running Out of Food in the Last Year: Never true     Ran Out of Food in the Last Year: Never true   Transportation Needs: No Transportation Needs (7/4/2025)    PRAPARE - Transportation     Lack of Transportation (Medical): No     Lack of Transportation (Non-Medical): No   Physical Activity: Not on File (10/13/2022)    Received from [facility]    Physical Activity     Physical Activity: 0   Stress: Not on File (10/13/2022)    Received from [facility]    Stress     Stress: 0   Social Connections: Not on File (10/13/2022)    [facility]

## 2025-07-04 NOTE — PROGRESS NOTES
pM assessment completed. No complaints of pain or discomfort voiced. No signs of symptoms of distress noted. Patient tolerated morning medications well. Respirations easy and even. Bed in lowest position, bed alarm in place and functioning properly, SR up x 2 and bed in low position. Call light within reach.     Bedside Mobility Assessment Tool (BMAT):     Assessment Level 1- Sit and Shake    1. From a semi-reclined position, ask patient to sit up and rotate to a seated position at the side of the bed. Can use the bedrail.    2. Ask patient to reach out and grab your hand and shake making sure patient reaches across his/her midline.   Pass- Patient is able to come to a seated position, maintain core strength. Maintains seated balance while reaching across midline. Move on to Assessment Level 2.     Assessment Level 2- Stretch and Point   1. With patient in seated position at the side of the bed, have patient place both feet on the floor (or stool) with knees no higher than hips.    2. Ask patient to stretch one leg and straighten the knee, then bend the ankle/flex and point the toes. If appropriate, repeat with the other leg.   Pass- Patient is able to demonstrate appropriate quad strength on intended weight bearing limb(s). Move onto Assessment Level 3.     Assessment Level 3- Stand   1. Ask patient to elevate off the bed or chair (seated to standing) using an assistive device (cane, bedrail).    2. Patient should be able to raise buttocks off be and hold for a count of five. May repeat once.   Pass- Patient maintains standing stability for at least 5 seconds, proceed to assessment level 4.    Assessment Level 4- Walk   1. Ask patient to march in place at bedside.    2. Then ask patient to advance step and return each foot. Some medical conditions may render a patient from stepping backwards, use your best clinical judgement.   Pass- Patient demonstrates balance while shifting weight and ability to step, takes

## 2025-07-04 NOTE — PROGRESS NOTES
Transferred care to JOHN Garcia. Face to face bedside report given, no need voiced at this time. Pt in bed with eyes closed. No signs of distress noted.  Call light within reach.   Denies needs.

## 2025-07-04 NOTE — ED PROVIDER NOTES
EMERGENCY DEPARTMENT ENCOUNTER     [facility] EMERGENCY DEPARTMENT     Pt Name: Wagner Hernandez   MRN: [id number]   Birthdate 1995   Date of evaluation: 7/3/2025   Provider: Issa Foley MD   PCP: Susanne Pastor DO   Note Started: 9:53 PM EDT 7/3/25     CHIEF COMPLAINT     No chief complaint on file.       HISTORY OF PRESENT ILLNESS:  History from : Patient        Wagner Hernandez is a 29 y.o. male who presents for for evaluation of left-sided numbness and weakness.  Reports that around 830 he was sitting at home when he felt very flushed.  Reports that he had pain in his left hand and arm and what sounds like a carpal spasm where his hand was clenched.  He states that he had associated paresthesias and felt numbness around the left side of his mouth and tongue.  Reports heaviness in his left lower extremity.  EMS reports that on arrival the patient appeared to be hyperventilating.  They were able to have the patient control his breathing and symptoms seem to resolve except for the heaviness in the extremity.  Patient was brought to the ED for evaluation.  He reports persistent weakness in the left lower extremity but states other symptoms have resolved.  He did has headache or changes in vision.  Injury neck pain or fevers.  Patient states that he had an episode earlier today around 1:00 where he felt like he was having numbness.  EMS reports patient was hyperventilating and heart rate was between 120 130 bpm.     Nursing Notes were all reviewed and agreed with or any disagreements were addressed in the HPI.     ROS: Positives and Pertinent negatives as per HPI.    PAST MEDICAL HISTORY     Past medical history:  has a past medical history of Diabetes mellitus (HCC), GERD (gastroesophageal reflux disease), Gout, and Headache.    Past surgical history:  has no past surgical history on file.    Med list:   No current facility-administered medications on file prior to encounter.     Current  the total shared critical care time excluding separately billable procedures.    I am the primary physician of Record.     FINAL IMPRESSION    1. DARIANA (acute kidney injury)    2. Metabolic acidosis         DISPOSITION/PLAN   DISPOSITION                  PATIENT REFERRED TO:   No follow-up provider specified.   DISCHARGE MEDICATIONS:   New Prescriptions    No medications on file      DISCONTINUED MEDICATIONS:   Discontinued Medications    No medications on file            (Please note that portions of this note were completed with a voice recognition program.  Efforts were made to edit the dictations but occasionally words are mis-transcribed.)     Issa Foley MD (electronically signed)                 Issa Foley MD  07/04/25 0530

## 2025-07-04 NOTE — PLAN OF CARE
Problem: Chronic Conditions and Co-morbidities  Goal: Patient's chronic conditions and co-morbidity symptoms are monitored and maintained or improved  Outcome: Progressing  Flowsheets (Taken 7/4/2025 0247)  Care Plan - Patient's Chronic Conditions and Co-Morbidity Symptoms are Monitored and Maintained or Improved: Monitor and assess patient's chronic conditions and comorbid symptoms for stability, deterioration, or improvement     Problem: Discharge Planning  Goal: Discharge to home or other facility with appropriate resources  Recent Flowsheet Documentation  Taken 7/4/2025 0247 by Emile Sims RN  Discharge to home or other facility with appropriate resources: Identify barriers to discharge with patient and caregiver  Taken 7/4/2025 0235 by Emile Sims RN  Discharge to home or other facility with appropriate resources: Identify barriers to discharge with patient and caregiver

## 2025-07-05 ENCOUNTER — APPOINTMENT (OUTPATIENT)
Dept: MRI IMAGING | Age: 30
DRG: 469 | End: 2025-07-05
Payer: COMMERCIAL

## 2025-07-05 ENCOUNTER — APPOINTMENT (OUTPATIENT)
Age: 30
DRG: 469 | End: 2025-07-05
Attending: STUDENT IN AN ORGANIZED HEALTH CARE EDUCATION/TRAINING PROGRAM
Payer: COMMERCIAL

## 2025-07-05 VITALS
OXYGEN SATURATION: 100 % | DIASTOLIC BLOOD PRESSURE: 86 MMHG | HEIGHT: 70 IN | BODY MASS INDEX: 38.94 KG/M2 | RESPIRATION RATE: 16 BRPM | SYSTOLIC BLOOD PRESSURE: 133 MMHG | TEMPERATURE: 97.9 F | HEART RATE: 86 BPM | WEIGHT: 272 LBS

## 2025-07-05 PROBLEM — R29.818 TRANSIENT NEUROLOGICAL SYMPTOMS: Status: ACTIVE | Noted: 2025-07-05

## 2025-07-05 PROBLEM — E79.0 HYPERURICEMIA: Status: ACTIVE | Noted: 2025-07-05

## 2025-07-05 PROBLEM — M1A.0620 IDIOPATHIC CHRONIC GOUT OF LEFT KNEE WITHOUT TOPHUS: Status: ACTIVE | Noted: 2025-07-05

## 2025-07-05 PROBLEM — E87.20 METABOLIC ACIDOSIS: Status: ACTIVE | Noted: 2025-07-05

## 2025-07-05 LAB
ANA SER QL IA: NEGATIVE
ANION GAP SERPL CALCULATED.3IONS-SCNC: 12 MMOL/L (ref 3–16)
BUN SERPL-MCNC: 16 MG/DL (ref 7–20)
CALCIUM SERPL-MCNC: 9.2 MG/DL (ref 8.3–10.6)
CCP IGG SERPL-ACNC: <0.5 U/ML (ref 0–2.9)
CHLORIDE SERPL-SCNC: 103 MMOL/L (ref 99–110)
CO2 SERPL-SCNC: 23 MMOL/L (ref 21–32)
CREAT SERPL-MCNC: 1 MG/DL (ref 0.9–1.3)
DSDNA AB SER-ACNC: <1 IU/ML (ref 0–9)
ECHO AO ASC DIAM: 2.8 CM
ECHO AO ASCENDING AORTA INDEX: 1.18 CM/M2
ECHO AO ROOT DIAM: 3.3 CM
ECHO AO ROOT INDEX: 1.39 CM/M2
ECHO AV MEAN GRADIENT: 3 MMHG
ECHO AV MEAN VELOCITY: 0.8 M/S
ECHO AV PEAK GRADIENT: 5 MMHG
ECHO AV PEAK VELOCITY: 1.2 M/S
ECHO AV VELOCITY RATIO: 0.92
ECHO AV VTI: 20.5 CM
ECHO BSA: 2.47 M2
ECHO EST RA PRESSURE: 8 MMHG
ECHO IVC EXP: 1.3 CM
ECHO IVC INSP: 0.7 CM
ECHO LA AREA 2C: 16.1 CM2
ECHO LA AREA 4C: 14.7 CM2
ECHO LA MAJOR AXIS: 5.4 CM
ECHO LA MINOR AXIS: 5.6 CM
ECHO LA VOL BP: 34 ML (ref 18–58)
ECHO LA VOL MOD A2C: 37 ML (ref 18–58)
ECHO LA VOL MOD A4C: 31 ML (ref 18–58)
ECHO LA VOL/BSA BIPLANE: 14 ML/M2 (ref 16–34)
ECHO LA VOLUME INDEX MOD A2C: 16 ML/M2 (ref 16–34)
ECHO LA VOLUME INDEX MOD A4C: 13 ML/M2 (ref 16–34)
ECHO LV E' LATERAL VELOCITY: 17.1 CM/S
ECHO LV E' SEPTAL VELOCITY: 13.1 CM/S
ECHO LV EDV A2C: 141 ML
ECHO LV EDV A4C: 149 ML
ECHO LV EDV INDEX A4C: 63 ML/M2
ECHO LV EDV NDEX A2C: 59 ML/M2
ECHO LV EF PHYSICIAN: 60 %
ECHO LV EJECTION FRACTION A2C: 71 %
ECHO LV EJECTION FRACTION A4C: 62 %
ECHO LV EJECTION FRACTION BIPLANE: 65 % (ref 55–100)
ECHO LV ESV A2C: 42 ML
ECHO LV ESV A4C: 57 ML
ECHO LV ESV INDEX A2C: 18 ML/M2
ECHO LV ESV INDEX A4C: 24 ML/M2
ECHO LV FRACTIONAL SHORTENING: 31 % (ref 28–44)
ECHO LV INTERNAL DIMENSION DIASTOLE INDEX: 1.76 CM/M2
ECHO LV INTERNAL DIMENSION DIASTOLIC: 4.2 CM (ref 4.2–5.9)
ECHO LV INTERNAL DIMENSION SYSTOLIC INDEX: 1.22 CM/M2
ECHO LV INTERNAL DIMENSION SYSTOLIC: 2.9 CM
ECHO LV ISOVOLUMETRIC RELAXATION TIME (IVRT): 102 MS
ECHO LV IVSD: 1.6 CM (ref 0.6–1)
ECHO LV MASS 2D: 262.6 G (ref 88–224)
ECHO LV MASS INDEX 2D: 110.3 G/M2 (ref 49–115)
ECHO LV POSTERIOR WALL DIASTOLIC: 1.5 CM (ref 0.6–1)
ECHO LV RELATIVE WALL THICKNESS RATIO: 0.71
ECHO LVOT AV VTI INDEX: 1.04
ECHO LVOT MEAN GRADIENT: 3 MMHG
ECHO LVOT PEAK GRADIENT: 5 MMHG
ECHO LVOT PEAK VELOCITY: 1.1 M/S
ECHO LVOT VTI: 21.3 CM
ECHO MV A VELOCITY: 0.63 M/S
ECHO MV E DECELERATION TIME (DT): 196 MS
ECHO MV E VELOCITY: 0.97 M/S
ECHO MV E/A RATIO: 1.54
ECHO MV E/E' LATERAL: 5.67
ECHO MV E/E' RATIO (AVERAGED): 6.54
ECHO MV E/E' SEPTAL: 7.4
ECHO MV LVOT VTI INDEX: 1.66
ECHO MV MAX VELOCITY: 1.1 M/S
ECHO MV MEAN GRADIENT: 2 MMHG
ECHO MV MEAN VELOCITY: 0.6 M/S
ECHO MV PEAK GRADIENT: 4 MMHG
ECHO MV VTI: 35.3 CM
ECHO PV MAX VELOCITY: 1.1 M/S
ECHO PV PEAK GRADIENT: 5 MMHG
ECHO RA AREA 4C: 10.9 CM2
ECHO RA END SYSTOLIC VOLUME APICAL 4 CHAMBER INDEX BSA: 10 ML/M2
ECHO RA VOLUME: 23 ML
ECHO RIGHT VENTRICULAR SYSTOLIC PRESSURE (RVSP): 33 MMHG
ECHO RV BASAL DIMENSION: 2.9 CM
ECHO RV FREE WALL PEAK S': 12.9 CM/S
ECHO RV LONGITUDINAL DIMENSION: 6.2 CM
ECHO RV MID DIMENSION: 2.4 CM
ECHO RV TAPSE: 1.9 CM (ref 1.7–?)
ECHO TV REGURGITANT MAX VELOCITY: 2.48 M/S
ECHO TV REGURGITANT PEAK GRADIENT: 25 MMHG
EST. AVERAGE GLUCOSE BLD GHB EST-MCNC: 99.7 MG/DL
GFR SERPLBLD CREATININE-BSD FMLA CKD-EPI: >90 ML/MIN/{1.73_M2}
GLUCOSE BLD-MCNC: 109 MG/DL (ref 70–99)
GLUCOSE BLD-MCNC: 87 MG/DL (ref 70–99)
GLUCOSE BLD-MCNC: 92 MG/DL (ref 70–99)
GLUCOSE SERPL-MCNC: 100 MG/DL (ref 70–99)
HBA1C MFR BLD: 5.1 %
PERFORMED ON: ABNORMAL
PERFORMED ON: NORMAL
PERFORMED ON: NORMAL
POTASSIUM SERPL-SCNC: 4 MMOL/L (ref 3.5–5.1)
RHEUMATOID FACT SER IA-ACNC: <10 IU/ML
SODIUM SERPL-SCNC: 138 MMOL/L (ref 136–145)
URATE SERPL-MCNC: 11.5 MG/DL (ref 3.5–7.2)

## 2025-07-05 PROCEDURE — 36415 COLL VENOUS BLD VENIPUNCTURE: CPT

## 2025-07-05 PROCEDURE — 93306 TTE W/DOPPLER COMPLETE: CPT | Performed by: STUDENT IN AN ORGANIZED HEALTH CARE EDUCATION/TRAINING PROGRAM

## 2025-07-05 PROCEDURE — 93306 TTE W/DOPPLER COMPLETE: CPT

## 2025-07-05 PROCEDURE — 70553 MRI BRAIN STEM W/O & W/DYE: CPT

## 2025-07-05 PROCEDURE — G0378 HOSPITAL OBSERVATION PER HR: HCPCS

## 2025-07-05 PROCEDURE — 6360000004 HC RX CONTRAST MEDICATION: Performed by: STUDENT IN AN ORGANIZED HEALTH CARE EDUCATION/TRAINING PROGRAM

## 2025-07-05 PROCEDURE — 99232 SBSQ HOSP IP/OBS MODERATE 35: CPT | Performed by: STUDENT IN AN ORGANIZED HEALTH CARE EDUCATION/TRAINING PROGRAM

## 2025-07-05 PROCEDURE — 96361 HYDRATE IV INFUSION ADD-ON: CPT

## 2025-07-05 PROCEDURE — 99238 HOSP IP/OBS DSCHRG MGMT 30/<: CPT | Performed by: INTERNAL MEDICINE

## 2025-07-05 PROCEDURE — A9579 GAD-BASE MR CONTRAST NOS,1ML: HCPCS | Performed by: STUDENT IN AN ORGANIZED HEALTH CARE EDUCATION/TRAINING PROGRAM

## 2025-07-05 PROCEDURE — 80048 BASIC METABOLIC PNL TOTAL CA: CPT

## 2025-07-05 PROCEDURE — 6370000000 HC RX 637 (ALT 250 FOR IP): Performed by: STUDENT IN AN ORGANIZED HEALTH CARE EDUCATION/TRAINING PROGRAM

## 2025-07-05 PROCEDURE — 84550 ASSAY OF BLOOD/URIC ACID: CPT

## 2025-07-05 RX ORDER — ALLOPURINOL 300 MG/1
300 TABLET ORAL DAILY
Qty: 30 TABLET | Refills: 3 | Status: SHIPPED | OUTPATIENT
Start: 2025-07-05

## 2025-07-05 RX ORDER — GADOTERIDOL 279.3 MG/ML
20 INJECTION INTRAVENOUS
Status: COMPLETED | OUTPATIENT
Start: 2025-07-05 | End: 2025-07-05

## 2025-07-05 RX ADMIN — TAMSULOSIN HYDROCHLORIDE 0.4 MG: 0.4 CAPSULE ORAL at 13:59

## 2025-07-05 RX ADMIN — GADOTERIDOL 20 ML: 279.3 INJECTION, SOLUTION INTRAVENOUS at 13:08

## 2025-07-05 RX ADMIN — ASPIRIN 81 MG: 81 TABLET, CHEWABLE ORAL at 13:59

## 2025-07-05 NOTE — PLAN OF CARE
Problem: Chronic Conditions and Co-morbidities  Goal: Patient's chronic conditions and co-morbidity symptoms are monitored and maintained or improved  7/5/2025 0725 by Vanna Moreland RN  Outcome: Progressing  7/4/2025 2246 by Ute Rodrigues RN  Outcome: Progressing     Problem: Discharge Planning  Goal: Discharge to home or other facility with appropriate resources  7/5/2025 0725 by Vanna Moreland RN  Outcome: Progressing  7/4/2025 2246 by Ute Rodrigues RN  Outcome: Progressing

## 2025-07-05 NOTE — PROGRESS NOTES
IM Progress Note    Admit Date:  7/3/2025  1    Interval history:  ARF, hx of gout       Subjective:  Mr. Hernandez seen up in bed, reports he was taking only colchicine for his gout but not allopurinol for several months .   Developed severe bilateral knee gouty arthritis with effusions and also renal stone in last 3 months    Works outside in hot weather as a     Tingling and numbness, cramping of left UE resolved since IVF were given       Objective:   /86   Pulse 75   Temp 98.3 °F (36.8 °C) (Oral)   Resp 16   Ht 1.778 m (5' 10\")   Wt 123.4 kg (272 lb)   SpO2 97%   BMI 39.03 kg/m²     Intake/Output Summary (Last 24 hours) at 7/5/2025 1155  Last data filed at 7/4/2025 1742  Gross per 24 hour   Intake 600 ml   Output --   Net 600 ml       Physical Exam:      General:  young obese male, Awake, alert and oriented. Appears to be not in any distress  Mucous Membranes:  Pink , anicteric  Neck: No JVD, no carotid bruit, no thyromegaly  Chest:  Clear to auscultation bilaterally, no added sounds  Cardiovascular:  RRR S1S2 heard, no murmurs or gallops  Abdomen:  Soft, undistended, non tender, no organomegaly, BS present  Extremities: mild effusion of left knee noted No edema or cyanosis. Distal pulses well felt  Neurological : grossly normal  Non focal       Medications:   Scheduled Medications:    aspirin  81 mg Oral Daily    Or    aspirin  300 mg Rectal Daily    sodium chloride flush  5-40 mL IntraVENous 2 times per day    enoxaparin  30 mg SubCUTAneous BID    melatonin  3 mg Oral Nightly    insulin lispro  0-4 Units SubCUTAneous 4x Daily AC & HS    tamsulosin  0.4 mg Oral Daily    atorvastatin  80 mg Oral Nightly     I   sodium chloride      dextrose       ondansetron **OR** ondansetron, polyethylene glycol, sodium chloride flush, sodium chloride, potassium chloride **OR** potassium alternative oral replacement **OR** potassium chloride, magnesium sulfate, acetaminophen **OR** acetaminophen, glucose,

## 2025-07-05 NOTE — CARE COORDINATION
Case Management Assessment  Initial Evaluation    Date/Time of Evaluation: 7/5/2025 9:40 AM  Assessment Completed by: Courtney Mccartney RN    If patient is discharged prior to next notation, then this note serves as note for discharge by case management.    Patient Name: Wagner Hernandez                   YOB: 1995  Diagnosis: Metabolic acidosis [E87.20]  DARIANA (acute kidney injury) [N17.9]                   Date / Time: 7/3/2025  9:51 PM    Patient Admission Status: Inpatient   Readmission Risk (Low < 19, Mod (19-27), High > 27): Readmission Risk Score: 7.2    Current PCP: Susanne Pastor, DO  PCP verified by CM?      Chart Reviewed: Yes      History Provided by:    Patient Orientation:      Patient Cognition:      Hospitalization in the last 30 days (Readmission):  No    If yes, Readmission Assessment in CM Navigator will be completed.    Advance Directives:      Code Status: Full Code   Patient's Primary Decision Maker is:      Primary Decision Maker: Chyna Hunter [facility] - [phone]    Discharge Planning:    Patient lives with: Spouse/Significant Other, Children Type of Home: Trailer/Mobile Home  Primary Care Giver:    Patient Support Systems include: Children, Spouse/Significant Other, Family Members   Current Financial resources:    Current community resources:    Current services prior to admission: None            Current DME:              Type of Home Care services:  None    ADLS  Prior functional level:    Current functional level:      PT AM-PAC:   /24  OT AM-PAC:   /24    Family can provide assistance at DC:    Would you like Case Management to discuss the discharge plan with any other family members/significant others, and if so, who?    Plans to Return to Present Housing:    Other Identified Issues/Barriers to RETURNING to current housing: na  Potential Assistance needed at discharge: N/A            Potential DME:    Patient expects to discharge to: Trailer/mobile home  Plan for

## 2025-07-05 NOTE — PLAN OF CARE
Problem: Chronic Conditions and Co-morbidities  Goal: Patient's chronic conditions and co-morbidity symptoms are monitored and maintained or improved  7/4/2025 2246 by Ute Rodrigues RN  Outcome: Progressing  7/4/2025 1446 by Radha Holland RN  Outcome: Progressing  7/4/2025 1446 by Radha Holland RN  Outcome: Progressing     Problem: Discharge Planning  Goal: Discharge to home or other facility with appropriate resources  7/4/2025 2246 by Ute Rodrigues RN  Outcome: Progressing  7/4/2025 1446 by Radha Holland RN  Outcome: Progressing  7/4/2025 1446 by Radha Holland RN  Outcome: Progressing  Flowsheets  Taken 7/4/2025 0247 by Emile Sims RN  Discharge to home or other facility with appropriate resources: Identify barriers to discharge with patient and caregiver  Taken 7/4/2025 0235 by Emile Sims RN  Discharge to home or other facility with appropriate resources: Identify barriers to discharge with patient and caregiver

## 2025-07-05 NOTE — PROGRESS NOTES
[facility]  Nephrology follow-up note           Reason for Consult: DARIANA  Requesting Physician:  Dr. Arizmendi    Sub/interval history  Denies any new complaints  Had MRI today  Renal function has improved    ROS: No chest pain/shortness of breath/fever/nausea/vomiting  PSFH: + visitor    Scheduled Meds:   aspirin  81 mg Oral Daily    Or    aspirin  300 mg Rectal Daily    sodium chloride flush  5-40 mL IntraVENous 2 times per day    enoxaparin  30 mg SubCUTAneous BID    melatonin  3 mg Oral Nightly    insulin lispro  0-4 Units SubCUTAneous 4x Daily AC & HS    tamsulosin  0.4 mg Oral Daily    atorvastatin  80 mg Oral Nightly     Continuous Infusions:   sodium chloride      dextrose       PRN Meds:.ondansetron **OR** ondansetron, polyethylene glycol, sodium chloride flush, sodium chloride, potassium chloride **OR** potassium alternative oral replacement **OR** potassium chloride, magnesium sulfate, acetaminophen **OR** acetaminophen, glucose, dextrose bolus **OR** dextrose bolus, glucagon (rDNA), dextrose    History of Present Illness on 7/4/25:    29 y.o. yo male with PMH of GERD, gout, diabetes, migraines, kidney stones who is admitted for DARIANA  Patient presented to the emergency room with complaints of numbness and tingling of the left side of his body which started out with cramping of his left hand followed by numbness of his tongue and then heaviness of his leg.  This symptom started around 5 PM.  The symptoms resolved by around 3 AM after coming to the hospital and getting IV fluids.  Patient reports that he works as a  and and has about 10-hour a day outside.  He takes ibuprofen.  Admits to drinking a lot of water and Gatorade  His creatinine on admission was 2.7 and sodium of 134 and calcium of 10.8 which has started to improve with IV fluid  Patient underwent CTA of his brain along with neck due to strokelike symptoms    Physical exam:   Constitutional:  VITALS:  /86   Pulse 75   Temp

## 2025-07-05 NOTE — FLOWSHEET NOTE
07/05/25 0900   Vital Signs   Temp 98.3 °F (36.8 °C)   Temp Source Oral   Pulse 75   Heart Rate Source Monitor   Respirations 16   /86  (Simultaneous filing. User may not have seen previous data.)   MAP (Calculated) 98  (Simultaneous filing. User may not have seen previous data.)   BP Location Right Arm   Patient Position High fowlers   Pain Assessment   Pain Assessment None - Denies Pain   Oxygen Therapy   SpO2 97 %   O2 Device None (Room air)   Height and Weight   Height 1.778 m (5' 10\")   Weight - Scale 123.4 kg (272 lb)   BSA (Calculated - sq m) 2.47 sq meters   BMI (Calculated) 39.1     AM assessment complete. Gave am meds due see mar. A/O x 4. Denies pain. Up as tolerated independent. MRI screening complete. ECHO complete results pending. Bed locked/lowest position. Call light within reach.

## 2025-07-05 NOTE — FLOWSHEET NOTE
07/05/25 1702   Vital Signs   Temp 97.9 °F (36.6 °C)   Temp Source Oral   Pulse 86   Heart Rate Source Monitor   Respirations 16   BP (!) 131/111   MAP (Calculated) 118   BP Location Right upper arm   Patient Position Semi fowlers   Oxygen Therapy   SpO2 100 %   O2 Device None (Room air)        07/05/25 1709   Vital Signs   /86   MAP (Calculated) 102     No Prescriptions and discharge instructions given. Pt verbalized understanding denies any questions/ needs at this time. Pt walked off floor with spouse to vehicle for discharge home

## 2025-07-05 NOTE — DISCHARGE INSTRUCTIONS
[facility] nephrology in 2 weeks    Resume allopurinol  daily     Low protein diet, avoid red meat

## 2025-07-05 NOTE — ACP (ADVANCE CARE PLANNING)
Advance Care Planning     General Advance Care Planning (ACP) Conversation    Date of Conversation: 7/5/2025  Conducted with: Patient with Decision Making Capacity  Other persons present: None    Healthcare Decision Maker:   Primary Decision Maker: Chyna Hunter - [facility] - [phone]       Content/Action Overview:  DECLINED ACP Conversation - will revisit periodically  Reviewed DNR/DNI and patient elects Full Code (Attempt Resuscitation)        Length of Voluntary ACP Conversation in minutes:  <16 minutes (Non-Billable)    Courtney Mccartney RN

## 2025-07-05 NOTE — PROGRESS NOTES
Neurology Follow-up  [facility] Neurology  Melecio Keyes MD    Date of Service: 07/05/25        Wagner Hernandez is a 29 y.o. male who  has a past medical history of Diabetes mellitus (HCC), GERD (gastroesophageal reflux disease), Gout, Headache, and Kidney stones.    Subjective:   CC: Follow up today regarding: seizure like activity    Events noted. Chart and lab reviewed. Nephrology assessment is DARIANA due to prerenal in setting of ibuprofen use and volume depletion, hypercalcemia likely due to volume depletion.     Physical Examination    Mental status:   Alert  Fully oriented    Recent memory normal   Attention normal  Language expression and comprehension normal     Cranial nerves:  Ocular motility was full  No nystagmus   Facial strength symmetrically intact   No dysarthria  Hearing acuity was normal    Tongue protrusion normal     Motor:   Normal muscle bulk  Normal muscle tone   Moving BUE and BLE symmetric at least greater than antigravity.      Coordination:   No tremor observed  No myoclonus observed     Data Reviewed:     I independently interpreted 7/5/25 brain MRI w/wo = normal. Final radiology read pending at time of note.      7/5/25 TTE =     Left Ventricle: Normal left ventricular systolic function with a visually estimated EF of 60 - 65%. EF by 2D Simpsons Biplane is 65%. Left ventricle size is normal. Moderately increased wall thickness. Findings consistent with concentric hypertrophy. No obvious regional wall motion abnormalities. Normal diastolic function.    Right Ventricle: Right ventricle size is normal. Normal systolic function.    Aortic Valve: Not well visualized. No regurgitation. No stenosis.    Mitral Valve: Valve structure is normal. No restricted motion. Mild regurgitation. No stenosis noted.    Tricuspid Valve: Valve structure is normal. No restricted motion. Mild regurgitation. Normal RVSP. TR Peak Gradient is 25 mmHg. Est. RA Pressure is 8 mmHg. RVSP is 33 mmHg.

## 2025-07-07 LAB
B2 GLYCOPROT1 IGG SERPL IA-ACNC: <10 SGU
B2 GLYCOPROT1 IGM SERPL IA-ACNC: 18 SMU
BACTERIA FLD AEROBE CULT: NORMAL
CARDIOLIPIN IGG SER IA-ACNC: <10 GPL
CARDIOLIPIN IGM SER IA-ACNC: <10 MPL
GRAM STN SPEC: NORMAL
MYELOPEROXIDASE AB SER-ACNC: 0 AU/ML (ref 0–19)
PROTEINASE3 AB SER-ACNC: 1 AU/ML (ref 0–19)

## 2025-07-09 LAB
APTT SCREEN TO CONFIRM RATIO: 0.91 {RATIO}
CONFIRM DRVVT STA-STACLOT: NORMAL S
DRVVT SCREEN TO CONFIRM RATIO: 0.99 {RATIO}
DRVVT SCREEN TO CONFIRM RATIO: NORMAL {RATIO}
DRVVT/DRVVT CFM P DOAC NEUT NORM PPP-RTO: NORMAL {RATIO}
HEPARIN NT PPP QL: NORMAL
LA 3 SCREEN W REFLEX-IMP: NORMAL
LMW HEPARIN IND PLT AB SER QL: NORMAL
MIXING DRVVT: NORMAL S
PROTHROMBIN TIME: 13.3 S (ref 12–15.5)
SCREEN APTT: NORMAL S
THROMBIN TIME: NORMAL S

## 2025-07-14 LAB
GLUCOSE BLD-MCNC: 135 MG/DL (ref 70–99)
PERFORMED ON: ABNORMAL

## 2025-08-08 ENCOUNTER — OFFICE VISIT (OUTPATIENT)
Dept: ORTHOPEDIC SURGERY | Age: 30
End: 2025-08-08

## 2025-08-08 VITALS — HEIGHT: 70 IN | WEIGHT: 272 LBS | BODY MASS INDEX: 38.94 KG/M2

## 2025-08-08 DIAGNOSIS — S83.231A COMPLEX TEAR OF MEDIAL MENISCUS OF RIGHT KNEE AS CURRENT INJURY, INITIAL ENCOUNTER: Primary | ICD-10-CM

## 2025-08-08 DIAGNOSIS — M25.561 ACUTE PAIN OF BOTH KNEES: ICD-10-CM

## 2025-08-08 DIAGNOSIS — M25.562 ACUTE PAIN OF BOTH KNEES: ICD-10-CM

## 2025-08-25 ENCOUNTER — TELEPHONE (OUTPATIENT)
Dept: ORTHOPEDIC SURGERY | Age: 30
End: 2025-08-25